# Patient Record
Sex: MALE | Race: WHITE | NOT HISPANIC OR LATINO | Employment: FULL TIME | URBAN - METROPOLITAN AREA
[De-identification: names, ages, dates, MRNs, and addresses within clinical notes are randomized per-mention and may not be internally consistent; named-entity substitution may affect disease eponyms.]

---

## 2017-03-10 DIAGNOSIS — I77.70 ARTERIAL DISSECTION (H): ICD-10-CM

## 2017-03-10 DIAGNOSIS — I10 BENIGN ESSENTIAL HYPERTENSION: ICD-10-CM

## 2017-03-10 DIAGNOSIS — E78.5 HYPERLIPIDEMIA LDL GOAL <70: ICD-10-CM

## 2017-03-10 RX ORDER — ATORVASTATIN CALCIUM 40 MG/1
40 TABLET, FILM COATED ORAL AT BEDTIME
Qty: 90 TABLET | Refills: 1 | Status: SHIPPED | OUTPATIENT
Start: 2017-03-10 | End: 2017-06-21

## 2017-03-10 RX ORDER — LABETALOL 200 MG/1
200 TABLET, FILM COATED ORAL EVERY 12 HOURS
Qty: 180 TABLET | Refills: 1 | Status: SHIPPED | OUTPATIENT
Start: 2017-03-10 | End: 2017-06-21

## 2017-03-10 RX ORDER — HYDROCHLOROTHIAZIDE 25 MG/1
25 TABLET ORAL DAILY
Qty: 90 TABLET | Refills: 1 | Status: SHIPPED | OUTPATIENT
Start: 2017-03-10 | End: 2017-06-21

## 2017-03-10 RX ORDER — AMLODIPINE BESYLATE 5 MG/1
5 TABLET ORAL DAILY
Qty: 90 TABLET | Refills: 1 | Status: SHIPPED | OUTPATIENT
Start: 2017-03-10 | End: 2017-06-21

## 2017-03-10 RX ORDER — RAMIPRIL 10 MG/1
10 CAPSULE ORAL AT BEDTIME
Qty: 90 CAPSULE | Refills: 1 | Status: SHIPPED | OUTPATIENT
Start: 2017-03-10 | End: 2017-06-21

## 2017-03-10 RX ORDER — HYDROCODONE BITARTRATE AND ACETAMINOPHEN 5; 325 MG/1; MG/1
1-2 TABLET ORAL EVERY 4 HOURS PRN
Qty: 30 TABLET | Refills: 0 | Status: SHIPPED | OUTPATIENT
Start: 2017-03-10 | End: 2017-06-08

## 2017-03-10 NOTE — TELEPHONE ENCOUNTER
Ramipral, HCTZ      Last Written Prescription Date: both on 5/26/16  Last Fill Quantity: both 90, # refills: both 3  Last Office Visit with [facility], [facility] or [facility] prescribing provider: 10/26/16       Potassium   Date Value Ref Range Status   10/21/2016 4.1 3.4 - 5.3 mmol/L Final     Creatinine   Date Value Ref Range Status   10/21/2016 0.90 0.66 - 1.25 mg/dL Final     BP Readings from Last 3 Encounters:   10/26/16 126/85   05/26/16 123/82   01/08/16 118/86     Labetalol,amlodipine      Last Written Prescription Date: both 5/26/16  Last Fill Quantity: both 90, # refills: both 3    Last Office Visit with [facility], [facility] or [facility] prescribing provider:  TERESSA   Future Office Visit:        BP Readings from Last 3 Encounters:   10/26/16 126/85   05/26/16 123/82   01/08/16 118/86     Atorvastatin     Last Written Prescription Date: SAB  Last Fill Quantity: 90, # refills: 3  Last Office Visit with [facility], [facility] or [facility] prescribing provider: TERESSA       Lab Results   Component Value Date    CHOL 145 10/21/2016     Lab Results   Component Value Date    HDL 39 10/21/2016     Lab Results   Component Value Date    LDL 83 10/21/2016     Lab Results   Component Value Date    TRIG 115 10/21/2016     Lab Results   Component Value Date    CHOLHDLRATIO 3.6 10/20/2015     Refill done per RN refill protocol  Tran Banuelos, RN, BSN    l

## 2017-06-08 DIAGNOSIS — I77.70 ARTERIAL DISSECTION (H): ICD-10-CM

## 2017-06-08 RX ORDER — HYDROCODONE BITARTRATE AND ACETAMINOPHEN 5; 325 MG/1; MG/1
1-2 TABLET ORAL EVERY 4 HOURS PRN
Qty: 30 TABLET | Refills: 0 | Status: SHIPPED | OUTPATIENT
Start: 2017-06-08 | End: 2017-09-07

## 2017-06-08 NOTE — TELEPHONE ENCOUNTER
Patient would like refill of [place]  Med and pharm loaded  Patient will pick  Up at  today  Tran Banuelos RN, BSN

## 2017-06-13 DIAGNOSIS — I77.79 DISSECTION OF MESENTERIC ARTERY (H): Primary | ICD-10-CM

## 2017-06-14 ENCOUNTER — HOSPITAL ENCOUNTER (OUTPATIENT)
Dept: LAB | Facility: CLINIC | Age: 57
End: 2017-06-14
Attending: INTERNAL MEDICINE
Payer: COMMERCIAL

## 2017-06-14 ENCOUNTER — HOSPITAL ENCOUNTER (OUTPATIENT)
Dept: CT IMAGING | Facility: CLINIC | Age: 57
Discharge: HOME | End: 2017-06-14
Attending: INTERNAL MEDICINE | Admitting: INTERNAL MEDICINE
Payer: COMMERCIAL

## 2017-06-14 DIAGNOSIS — I77.79 DISSECTION OF MESENTERIC ARTERY (H): ICD-10-CM

## 2017-06-14 DIAGNOSIS — Z00.00 LABORATORY EXAMINATION ORDERED AS PART OF A ROUTINE GENERAL MEDICAL EXAMINATION: ICD-10-CM

## 2017-06-14 LAB
ALBUMIN SERPL-MCNC: 3.9 G/DL (ref 3.4–5)
ALP SERPL-CCNC: 59 U/L (ref 40–150)
ALT SERPL W P-5'-P-CCNC: 46 U/L (ref 0–70)
ANION GAP SERPL CALCULATED.3IONS-SCNC: 4 MMOL/L (ref 3–14)
AST SERPL W P-5'-P-CCNC: 20 U/L (ref 0–45)
BILIRUB SERPL-MCNC: 0.5 MG/DL (ref 0.2–1.3)
BUN SERPL-MCNC: 17 MG/DL (ref 7–30)
CALCIUM SERPL-MCNC: 8.8 MG/DL (ref 8.5–10.1)
CHLORIDE SERPL-SCNC: 106 MMOL/L (ref 94–109)
CO2 SERPL-SCNC: 30 MMOL/L (ref 20–32)
CREAT SERPL-MCNC: 0.84 MG/DL (ref 0.66–1.25)
GFR SERPL CREATININE-BSD FRML MDRD: ABNORMAL ML/MIN/1.7M2
GLUCOSE SERPL-MCNC: 113 MG/DL (ref 70–99)
POTASSIUM SERPL-SCNC: 4 MMOL/L (ref 3.4–5.3)
PROT SERPL-MCNC: 6.6 G/DL (ref 6.8–8.8)
SODIUM SERPL-SCNC: 140 MMOL/L (ref 133–144)

## 2017-06-14 PROCEDURE — 25000128 H RX IP 250 OP 636: Performed by: INTERNAL MEDICINE

## 2017-06-14 PROCEDURE — 74174 CTA ABD&PLVS W/CONTRAST: CPT

## 2017-06-14 PROCEDURE — 80053 COMPREHEN METABOLIC PANEL: CPT | Performed by: INTERNAL MEDICINE

## 2017-06-14 PROCEDURE — 36415 COLL VENOUS BLD VENIPUNCTURE: CPT | Performed by: INTERNAL MEDICINE

## 2017-06-14 PROCEDURE — 25000125 ZZHC RX 250: Performed by: INTERNAL MEDICINE

## 2017-06-14 RX ORDER — IOPAMIDOL 755 MG/ML
80 INJECTION, SOLUTION INTRAVASCULAR ONCE
Status: COMPLETED | OUTPATIENT
Start: 2017-06-14 | End: 2017-06-14

## 2017-06-14 RX ADMIN — SODIUM CHLORIDE 80 ML: 9 INJECTION, SOLUTION INTRAVENOUS at 10:17

## 2017-06-14 RX ADMIN — IOPAMIDOL 80 ML: 755 INJECTION, SOLUTION INTRAVENOUS at 10:16

## 2017-06-21 ENCOUNTER — OFFICE VISIT (OUTPATIENT)
Dept: OTHER | Facility: CLINIC | Age: 57
End: 2017-06-21
Attending: INTERNAL MEDICINE
Payer: COMMERCIAL

## 2017-06-21 VITALS
DIASTOLIC BLOOD PRESSURE: 73 MMHG | BODY MASS INDEX: 28.51 KG/M2 | SYSTOLIC BLOOD PRESSURE: 106 MMHG | WEIGHT: 182 LBS | OXYGEN SATURATION: 97 % | HEART RATE: 72 BPM

## 2017-06-21 DIAGNOSIS — E78.5 HYPERLIPIDEMIA LDL GOAL <70: ICD-10-CM

## 2017-06-21 DIAGNOSIS — I10 BENIGN ESSENTIAL HYPERTENSION: ICD-10-CM

## 2017-06-21 DIAGNOSIS — I77.70 ARTERIAL DISSECTION (H): Primary | ICD-10-CM

## 2017-06-21 PROCEDURE — 99215 OFFICE O/P EST HI 40 MIN: CPT | Mod: ZP | Performed by: INTERNAL MEDICINE

## 2017-06-21 PROCEDURE — 99211 OFF/OP EST MAY X REQ PHY/QHP: CPT

## 2017-06-21 RX ORDER — AMLODIPINE BESYLATE 5 MG/1
5 TABLET ORAL DAILY
Qty: 90 TABLET | Refills: 3 | Status: SHIPPED | OUTPATIENT
Start: 2017-06-21 | End: 2018-12-11

## 2017-06-21 RX ORDER — ATORVASTATIN CALCIUM 40 MG/1
40 TABLET, FILM COATED ORAL AT BEDTIME
Qty: 90 TABLET | Refills: 3 | Status: SHIPPED | OUTPATIENT
Start: 2017-06-21 | End: 2019-11-07

## 2017-06-21 RX ORDER — HYDROCHLOROTHIAZIDE 25 MG/1
25 TABLET ORAL DAILY
Qty: 90 TABLET | Refills: 3 | Status: SHIPPED | OUTPATIENT
Start: 2017-06-21 | End: 2018-12-11

## 2017-06-21 RX ORDER — RAMIPRIL 10 MG/1
10 CAPSULE ORAL AT BEDTIME
Qty: 90 CAPSULE | Refills: 3 | Status: SHIPPED | OUTPATIENT
Start: 2017-06-21 | End: 2019-11-07

## 2017-06-21 RX ORDER — LABETALOL 200 MG/1
200 TABLET, FILM COATED ORAL EVERY 12 HOURS
Qty: 180 TABLET | Refills: 3 | Status: SHIPPED | OUTPATIENT
Start: 2017-06-21 | End: 2019-11-07

## 2017-06-21 NOTE — PATIENT INSTRUCTIONS
Your CTA looks really good and completely healed previous dissection ( good!)    Continue current medications    See me in 6 months    Plan for repeating CTA in 18 months.    Follow same instructions.

## 2017-06-21 NOTE — PROGRESS NOTES
HPI: Stanley Garcia is a 55 year old year old patient who receives primary care from Erica Collado here today for the follow-up visit and he was initially  admitted to the [facility] on September 10, 2015-September 16, 2015 for extensive isolated acute SMA dissection without malperfusion.  It was thought spontaneous and his workup was unremarkable and negative.    Later he  Gave a history of injury  one week before dissection( blunt injury to the abdomen) and he ran into edge of  heavy duty board during the Labor Day weekend ( one week before admission)     After the discharge from the hospital he is doing well, blood pressure is well controlled with multiple medications.  He is tolerating statin.  He stopped warfarin last year  and currently on aspirin 162 mg daily  He denies any chest pain, shortness of breath or palpitations.  No abdominal pain, no altered bowel movements.  No hematemesis or melena.    Recent CTA 6/2017 completely healed dissection of SMA.  Reviewed CTA and copy given.    PAST MEDICAL HISTORY  Past Medical History:   Diagnosis Date     Anxiety      HTN (hypertension)      Superior mesenteric artery thrombosis (H)     acute extensive isolated SMA disection without malperfusion 9/2015 admitted to [facility]       CURRENT MEDICATIONS  Current Outpatient Prescriptions   Medication Sig Dispense Refill     ramipril (ALTACE) 10 MG capsule Take 1 capsule (10 mg) by mouth At Bedtime 90 capsule 3     labetalol (NORMODYNE) 200 MG tablet Take 1 tablet (200 mg) by mouth every 12 hours 180 tablet 3     amLODIPine (NORVASC) 5 MG tablet Take 1 tablet (5 mg) by mouth daily 90 tablet 3     atorvastatin (LIPITOR) 40 MG tablet Take 1 tablet (40 mg) by mouth At Bedtime 90 tablet 3     hydrochlorothiazide (HYDRODIURIL) 25 MG tablet Take 1 tablet (25 mg) by mouth daily 90 tablet 3     HYDROcodone-acetaminophen (NORCO) 5-325 MG per tablet Take 1-2 tablets by mouth every 4 hours as needed for moderate to severe pain 30 tablet 0      aspirin EC 81 MG tablet Take 162 mg by mouth       order for DME Equipment being ordered: Blood pressure cuff 1 Device 0     multivitamin, therapeutic (THERA-VIT) TABS Take 1 tablet by mouth daily       [DISCONTINUED] ramipril (ALTACE) 10 MG capsule Take 1 capsule (10 mg) by mouth At Bedtime 90 capsule 1     [DISCONTINUED] labetalol (NORMODYNE) 200 MG tablet Take 1 tablet (200 mg) by mouth every 12 hours 180 tablet 1     [DISCONTINUED] amLODIPine (NORVASC) 5 MG tablet Take 1 tablet (5 mg) by mouth daily 90 tablet 1     [DISCONTINUED] atorvastatin (LIPITOR) 40 MG tablet Take 1 tablet (40 mg) by mouth At Bedtime 90 tablet 1     [DISCONTINUED] hydrochlorothiazide (HYDRODIURIL) 25 MG tablet Take 1 tablet (25 mg) by mouth daily 90 tablet 1       PAST SURGICAL HISTORY:  Past Surgical History:   Procedure Laterality Date     ORTHOPEDIC SURGERY      left ankle       ALLERGIES   No Known Allergies    FAMILY HISTORY  Family History   Problem Relation Age of Onset     Other Cancer Father      esophageal     CEREBROVASCULAR DISEASE Mother        VASCULAR FAMILY HISTORY  1st order relative with atherosclerotic PAD: No  1st order relative with AAA: No    SOCIAL HISTORY  Social History     Social History     Marital status:      Spouse name: Kerry     Number of children: 3     Years of education: N/A     Occupational History     [employer]     Social History Main Topics     Smoking status: Never Smoker     Smokeless tobacco: Former User      Comment: quit at age 29 ( chewed tobacco for 10 years??)     Alcohol use 0.0 oz/week     0 Standard drinks or equivalent per week      Comment: occ beer      Drug use: No     Sexual activity: Yes     Partners: Female     Other Topics Concern     Not on file     Social History Narrative       ROS:   Constitutional: No fever, chills, or sweats. No weight gain/loss   ENT: No visual disturbance, ear ache, epistaxis, sore throat  Allergies/Immunologic: Negative  Respiratory:  No cough, hemoptysia  Cardiovascular: As per HPI  GI: No nausea, vomiting, hematemesis, melena, or hematochezia  : No urinary frequency, dysuria, or hematuria  Integument: Negative  Psychiatric: Negative  Neuro: Negative  Endocrinology: Negative   Musculoskeletal: Negative  Vascular: No walking impairment, claudication, ischemic rest pain or nonhealing wounds    EXAM:  /73 (BP Location: Right arm, Patient Position: Chair, Cuff Size: Adult Large)  Pulse 72  Wt 182 lb (82.6 kg)  SpO2 97%  BMI 28.51 kg/m2  In general, the patient is a pleasant male in no apparent distress.    HEENT: NC/AT.  PERRLA.  EOMI.  Sclerae white, not injected.  Nares clear.  Pharynx without erythema or exudate.  Dentition intact.    Neck: No adenopathy.  No thyromegaly. Carotids +2/2 bilaterally without bruits.  No jugular venous distension.   Heart: RRR. Normal S1, S2 splits physiologically. No murmur, rub, click, or gallop. The PMI is in the 5th ICS in the midclavicular line. There is no heave.    Lungs: CTA.  No ronchi, wheezes, rales.  No dullness to percussion.   Abdomen: Soft, nontender, nondistended. No organomegaly. No AAA.  No bruits.   Extremities: No clubbing, cyanosis, or edema.  No wounds. No varicose veins signs of chronic venous insufficiency.   Vascular: No bruits are noted.   Brachial Radial Ulnar Femoral Popliteal DP PT   Left 2/2 2/2 2/2 2/2 2/2 2/2 2/2   Right 2/2 2/2 2/2 2/2 2/2 2/2 2/2     Labs:  LIPID RESULTS:  Lab Results   Component Value Date    CHOL 145 10/21/2016    HDL 39 (L) 10/21/2016    LDL 83 10/21/2016    TRIG 115 10/21/2016    CHOLHDLRATIO 3.6 10/20/2015       LIVER ENZYME RESULTS:  Lab Results   Component Value Date    AST 20 06/14/2017    ALT 46 06/14/2017       CBC RESULTS:  Lab Results   Component Value Date    WBC 8.8 01/08/2016    RBC 4.99 01/08/2016    HGB 15.9 01/08/2016    HCT 43.7 01/08/2016    MCV 88 01/08/2016    MCH 31.9 01/08/2016    MCHC 36.4 01/08/2016    RDW 12.5 01/08/2016    PLT  233 01/08/2016       BMP RESULTS:  Lab Results   Component Value Date     06/14/2017    POTASSIUM 4.0 06/14/2017    CHLORIDE 106 06/14/2017    CO2 30 06/14/2017    ANIONGAP 4 06/14/2017     (H) 06/14/2017    BUN 17 06/14/2017    CR 0.84 06/14/2017    GFRESTIMATED >90  Non  GFR Calc   06/14/2017    GFRESTBLACK >90   GFR Calc   06/14/2017    ALONDRA 8.8 06/14/2017        A1C RESULTS:  Lab Results   Component Value Date    A1C 5.3 09/11/2015       Procedures:  CTA ANGIOGRAM ABDOMEN 10/13/2015 12:13 PM   HISTORY: Follow up superior mesenteric artery dissection.  COMPARISON: 9/14/2015.  TECHNIQUE: IV contrast CT angiography is performed through the abdomen  and pelvis utilizing 80 mL of Isovue-370. 3D reconstructions were  performed at an independent workstation.  FINDINGS: Again noted is a dissection of the proximal and mid  superior mesenteric artery beginning just beyond its origin and  extending to involve the origins of the first several jejunal  branches. Since the previous study there has been enlargement of the  false lumen with increased pooling of contrast via fenestrations into  the false lumen approximately 2 cm from the vessel origin. The  contrast collection pooling in this region now measures approximately  25 x 10 mm compared to 13 x 7 mm previously. As before, this is  associated with diffuse narrowing of the true lumen in this region,  although the vessel remains patent with filling of the distal jejunal  branches. As before, the remaining false lumen is unopacified and  presumably is thrombosed. The celiac trunk, the inferior mesenteric  artery, and the renal arteries remain patent and unchanged. The  abdominal aorta and the iliac vessels are normal and unchanged. There  is no evidence of mural edema to suggest bowel ischemia, although the  accuracy of this is diminished due to the lack of oral contrast.  Remainder of the abdomen and pelvis is  unchanged.  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 There  is persistent but stable narrowing of the true lumen of the proximal  and mid superior mesenteric artery which does not appear to be  progressively compromised despite the enlarging false lumen. The  remaining visceral vessels as well as the aortoiliac vessels remain  normal and unchanged. No evidence of mural edema to suggest bowel  ischemia on this nonoral contrast scan.  RENZO BURCIAGA MD        CTA ANGIOGRAM ABDOMEN AND PELVIS  5/19/2016 1:58 PM       HISTORY:  Follow up superior mesenteric artery dissection.     COMPARISON:  1/8/2016.     TECHNIQUE: IV contrast CT angiography is performed through the abdomen  and pelvis utilizing 80 mL of Isovue-370. 3D reconstructions were  performed at an independent workstation.     FINDINGS: Again noted is a localized dissection of the superior  mesenteric artery beginning approximately 2 cm from its origin. Since  the previous study there has been almost complete thrombosis of the  false lumen with improvement in the degree of extrinsic compression of  the true lumen which is now almost completely re-expanded to normal  caliber. There is now only minimal opacification of the false lumen  with contrast. There is no evidence of expansion of the dissection.     The celiac trunk, the inferior mesenteric artery, and the renal  arteries remain widely patent. The abdominal aorta and the common  iliac arteries normal.     There are stable multiple hepatic and right lower pole renal cysts.  There is a small stable nodule involving the medial aspect of the  right adrenal gland. There also appears to be mild stable thickening  or small nodule in the medial aspect of the left adrenal gland. These  are unchanged. Remainder of the abdomen and pelvis is negative except  for hypertrophic degenerative changes in the lumbar  spine.                                                                       IMPRESSION:    1. Interval partial thrombosis of the false lumen of the previous  superior mesenteric artery localized dissection with resultant  improvement in the luminal diameter of the true lumen with reduction  in extrinsic compression of the true lumen.  2. The remaining visceral vessels as well as the abdominal aorta and  visualized iliac arteries are normal.  3. Stable hepatic and right renal cyst.  4. Stable thickening or small nodule in the right adrenal gland and  stable mild thickening in the left adrenal gland.       RENZO BURCIAGA MD        CTA ANGIOGRAM ABDOMEN AND PELVIS  6/14/2017  1:16 PM     HISTORY: 57-year-old patient with history of superior mesenteric  arterial dissection.     COMPARISON: May 19, 2016.     TECHNIQUE: Multiplanar and multi formatted CTA images were obtained  from the lung bases through the abdomen and pelvis after the  uneventful administration of Isovue-370 intravenous contrast given for  a total of 80 mL. Radiation dose for this scan was reduced using  automated exposure control, adjustment of the mA and/or kV according  to patient size, or iterative reconstruction technique. 3-D  reformatted images were created at a separate workstation.     FINDINGS: The visible lung bases are clear. Heart size is normal. No  pleural effusion or pericardial effusion. Multiple hepatic cysts are  identified. The largest is in the left hepatic lobe measuring 4  Hounsfield units and 3.7 x 4.2 cm, relatively unchanged from a  previous exam. The gallbladder, spleen, adrenal glands, and pancreas  are unremarkable. Both kidneys are normally perfused and without  hydronephrosis. Right renal hypodensities again identified and  unchanged. No intraperitoneal fluid or air. Bladder is mostly  decompressed and unremarkable. Sigmoid colon diverticulosis, without  evidence of diverticulitis. Appendix is normal in size and  appearance  in the right lower quadrant. No acute osseous abnormality.     The abdominal aorta is normal in size and appearance. The celiac axis,  SMA, bilateral renal, the inferior mesenteric arteries are patent.  Both common iliac, internal iliac, external iliac, common femoral  arteries are patent. Small focal dissection is again visible in the  superior mesenteric artery. Widest diameter is up to 9 mm AP,  unchanged from previous exam. The appearance of a possible dissection  is best identified on axial CT imaging, though difficult to visualize  on sagittal imaging. In comparison to previous CT exam on January 8, 2016, suspect this is an essentially healed dissection without  residual stenosis.         IMPRESSION:  1. Patent visceral arteries. Specifically, previously identified  dissection in the superior mesenteric artery appears to have  essentially completely healed. Minimal residual irregularity, though  not flow-limiting and unchanged from most recent CT exam on May 19,  2016. No new or additional dissections.  2. Unchanged hepatic and right renal cysts.     EMIGDIO WISEMAN MD    Assessment and Plan:     1. Extensive isolated acute SMA dissection without malperfusion 9/10/15 :( this appears related to blunt injury to the abdomen, ran into edge of the board  one week before the admission ??) improved on recent CTA of abdomen 5/19/16.    The patient presented to the [facility]  after an acute onset of significant abdominal pain. Imaging revealed an extensive acute superior mesenteric artery dissection. The true lumen of the dissection was patent but diffusely narrowed throughout its extent. There was no evidence of bowel ischemia. Due to the extensive size of the dissection (approximately 8 cm in length), the patient was admitted and started on IV heparin. He was evaluated by Vascular Surgery and no surgical intervention was indicated. At that time it was thought  likely  Due to secondary to severe hypertension as  his blood pressure was moderately elevated upon arrival.     Currently on multiple BP meds with excellent control  Repeat CTA last week completely healed dissection  He denies any CP, Abdominal pain, no weight loss or postprandial issues.    He has no family history of connective tissue disorders and his clinical exam did not fit with Marfan's, EDS or LDS. In addition, his rheumatoid factor, TRINA, and vasculitis panel were all negative.  He is currently  on multiple antihypertensives, including amlodipine, labetalol, hydrochlorothiazide, and ramipril. He was  initiated Lipitor 40 mg daily for pleiotropic benefit.No abdominal pain, hematemesis or melena.  His blood pressure is well controlled.     He underwent repeat CTA in 6/2017 As delineated above, completely healed dissection.    Reviewed recent lab results in care everywhere done at [facility] in 3/2016  Improved LDL 62, normal renal Fx  Normal HGB etc.      Plan:  Continue current medications  Avoid lifting heavy weights  Continue  aspirin 162 mg daily with food.  Maintain good BP  Plan for CTA abdomen in 18 months ( 12/2018)      2. Hypertension    His blood pressure is well controlled systolic less than 120 consistently.  Tolerating medications without any problem.  Continue the same  Reviewed out side BP readings.      3. Generalized Anxiety    Improved and he is not requiring any Xanax or Ativan.    Time spent today 50 minutes, face-to-face and more than 50% time spent counseling and coordination of the care    This note was dictated by utilizing dragon software  Cc: Primary care physician and   Dr. Marcella Momin M.D.  RTC 4 months.

## 2017-06-21 NOTE — NURSING NOTE
"Chief Complaint   Patient presents with     RECHECK     follow up CT and labs       Initial /73 (BP Location: Right arm, Patient Position: Chair, Cuff Size: Adult Large)  Pulse 72  Wt 182 lb (82.6 kg)  SpO2 97%  BMI 28.51 kg/m2 Estimated body mass index is 28.51 kg/(m^2) as calculated from the following:    Height as of 1/8/16: 5' 7\" (1.702 m).    Weight as of this encounter: 182 lb (82.6 kg).  Medication Reconciliation: complete     Face to face nursing time: 8 minutes    Beth Ho MA     "

## 2017-06-21 NOTE — MR AVS SNAPSHOT
"              After Visit Summary   6/21/2017    Stanley Garcia    MRN: [id number]           Patient Information     Date Of Birth          1960        Visit Information        Provider Department      6/21/2017 9:00 AM Nita Romero MD [facility]  [facility]      Today's Diagnoses      spontaneous SMA disection with no malperfusion 9/10/2015    -  1    Hyperlipidemia LDL goal <70        Benign essential hypertension          Care Instructions    Your CTA looks really good and completely healed previous dissection ( good!)    Continue current medications    See me in 6 months    Plan for repeating CTA in 18 months.    Follow same instructions.          Follow-ups after your visit        Who to contact     If you have questions or need follow up information about today's clinic visit or your schedule please contact [facility] directly at [phone].  Normal or non-critical lab and imaging results will be communicated to you by MyChart, letter or phone within 4 business days after the clinic has received the results. If you do not hear from us within 7 days, please contact the clinic through MyChart or phone. If you have a critical or abnormal lab result, we will notify you by phone as soon as possible.  Submit refill requests through [employer] [facility] or call your pharmacy and they will forward the refill request to us. Please allow 3 business days for your refill to be completed.          Additional Information About Your Visit        MyChart Information     [employer] lets you send messages to your doctor, view your test results, renew your prescriptions, schedule appointments and more. To sign up, go to [email or web address] . Click on \"Log in\" on the left side of the screen, which will take you to the Welcome page. Then click on \"Sign up Now\" on the right side of the page.     You will be asked to enter the access code listed " below, as well as some personal information. Please follow the directions to create your username and password.     Your access code is: [email or web address]  Expires: 2017  9:54 AM     Your access code will  in 90 days. If you need help or a new code, please call your [place] clinic or [phone].        Care EveryWhere ID     This is your Care EveryWhere ID. This could be used by other organizations to access your [place] medical records  [id number]        Your Vitals Were     Pulse Pulse Oximetry BMI (Body Mass Index)             72 97% 28.51 kg/m2          Blood Pressure from Last 3 Encounters:   17 106/73   10/26/16 126/85   16 123/82    Weight from Last 3 Encounters:   17 182 lb (82.6 kg)   10/26/16 179 lb 6.4 oz (81.4 kg)   16 178 lb 3.2 oz (80.8 kg)              We Performed the Following     Follow-Up with Vascular Medicine          Where to get your medicines      These medications were sent to [facility] [address] [place] [address] [address] [facility]  [address] [facility] [address]     Phone:  [phone]     amLODIPine 5 MG tablet    atorvastatin 40 MG tablet    hydrochlorothiazide 25 MG tablet    labetalol 200 MG tablet    ramipril 10 MG capsule          Primary Care Provider Office Phone # Fax #    Erica Collado [phone]       [facility] [id number] [place] [address]        Equal Access to Services     MURTAZA MONTES AH: Hadii aad ku hadasho Soomaali, waaxda luqadaha, qaybta kaalmada adeegyada, rosalba quinteros. So [facility] [phone].    ATENCIÓN: Si habla español, tiene a rodriguez disposición servicios gratuitos de asistencia lingüística. Llame al [phone].    We comply with applicable federal civil rights laws and [place] laws. We do not discriminate on the basis of race, color, national origin, age, disability sex, sexual orientation or gender identity.            Thank you!     Thank you  for choosing [facility]  for your care. Our goal is always to provide you with excellent care. Hearing back from our patients is one way we can continue to improve our services. Please take a few minutes to complete the written survey that you may receive in the mail after your visit with us. Thank you!             Your Updated Medication List - Protect others around you: Learn how to safely use, store and throw away your medicines at www.disposemymeds.org.          This list is accurate as of: 6/21/17  9:54 AM.  Always use your most recent med list.                   Brand Name Dispense Instructions for use Diagnosis    amLODIPine 5 MG tablet    NORVASC    90 tablet    Take 1 tablet (5 mg) by mouth daily    Benign essential hypertension       aspirin EC 81 MG EC tablet      Take 162 mg by mouth        atorvastatin 40 MG tablet    LIPITOR    90 tablet    Take 1 tablet (40 mg) by mouth At Bedtime    Hyperlipidemia LDL goal <70, Arterial dissection (H)       hydrochlorothiazide 25 MG tablet    HYDRODIURIL    90 tablet    Take 1 tablet (25 mg) by mouth daily    Benign essential hypertension       HYDROcodone-acetaminophen 5-325 MG per tablet    NORCO    30 tablet    Take 1-2 tablets by mouth every 4 hours as needed for moderate to severe pain    Arterial dissection (H)       labetalol 200 MG tablet    NORMODYNE    180 tablet    Take 1 tablet (200 mg) by mouth every 12 hours    Benign essential hypertension       multivitamin, therapeutic Tabs tablet      Take 1 tablet by mouth daily        order for DME     1 Device    Equipment being ordered: Blood pressure cuff    HTN (hypertension), Arterial dissection (H)       ramipril 10 MG capsule    ALTACE    90 capsule    Take 1 capsule (10 mg) by mouth At Bedtime    Benign essential hypertension

## 2017-09-07 DIAGNOSIS — I77.70 ARTERIAL DISSECTION (H): ICD-10-CM

## 2017-09-07 RX ORDER — HYDROCODONE BITARTRATE AND ACETAMINOPHEN 5; 325 MG/1; MG/1
1-2 TABLET ORAL EVERY 4 HOURS PRN
Qty: 30 TABLET | Refills: 0 | Status: SHIPPED | OUTPATIENT
Start: 2017-09-07 | End: 2017-11-22

## 2017-11-22 ENCOUNTER — OFFICE VISIT (OUTPATIENT)
Dept: OTHER | Facility: CLINIC | Age: 57
End: 2017-11-22
Attending: INTERNAL MEDICINE
Payer: COMMERCIAL

## 2017-11-22 VITALS
HEART RATE: 64 BPM | DIASTOLIC BLOOD PRESSURE: 83 MMHG | OXYGEN SATURATION: 98 % | SYSTOLIC BLOOD PRESSURE: 123 MMHG | WEIGHT: 180 LBS | BODY MASS INDEX: 28.19 KG/M2

## 2017-11-22 DIAGNOSIS — E78.5 HYPERLIPIDEMIA LDL GOAL <70: ICD-10-CM

## 2017-11-22 DIAGNOSIS — I10 BENIGN ESSENTIAL HYPERTENSION: ICD-10-CM

## 2017-11-22 DIAGNOSIS — F41.1 GENERALIZED ANXIETY DISORDER: ICD-10-CM

## 2017-11-22 DIAGNOSIS — I77.70 ARTERIAL DISSECTION (H): Primary | ICD-10-CM

## 2017-11-22 PROCEDURE — 99211 OFF/OP EST MAY X REQ PHY/QHP: CPT

## 2017-11-22 PROCEDURE — 99214 OFFICE O/P EST MOD 30 MIN: CPT | Mod: ZP | Performed by: INTERNAL MEDICINE

## 2017-11-22 RX ORDER — HYDROCODONE BITARTRATE AND ACETAMINOPHEN 5; 325 MG/1; MG/1
1-2 TABLET ORAL EVERY 4 HOURS PRN
Qty: 30 TABLET | Refills: 0 | Status: SHIPPED | OUTPATIENT
Start: 2017-11-22 | End: 2018-12-11

## 2017-11-22 NOTE — MR AVS SNAPSHOT
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 Continue same medications    2. Get fasting lipids at primary     3. Will plan for repeat CTA of abdomen  18 months from last one     4.monitor BP          Follow-ups after your visit        Follow-up notes from your care team     Return in about 6 months (around 5/22/2018).      Who to contact     If you have questions or need follow up information about today's clinic visit or your schedule please contact [facility] directly at [phone].  Normal or non-critical lab and imaging results will be communicated to you by [employer], letter or phone within 4 business days after the clinic has received the results. If you do not hear from us within 7 days, please contact the clinic through [employer] or phone. If you have a critical or abnormal lab result, we will notify you by phone as soon as possible.  Submit refill requests through [employer] or call your pharmacy and they will forward the refill request to us. Please allow 3 business days for your refill to be completed.          Additional Information About Your Visit        [employer] Information     UpTap lets you send messages to your doctor, view your test results, renew your prescriptions, schedule appointments and more. To sign up, go to [email or web address] . Click on \"Log in\" on the left side of the screen, which will take you to the Welcome page. Then click on \"Sign up Now\" " on the right side of the page.     You will be asked to enter the access code listed below, as well as some personal information. Please follow the directions to create your username and password.     Your access code is: [email or web address]  Expires: 2018  1:51 PM     Your access code will  in 90 days. If you need help or a new code, please call your [facility] clinic or [phone].        Care EveryWhere ID     This is your Care EveryWhere ID. This could be used by other organizations to access your [facility] medical records  [id number]        Your Vitals Were     Pulse Pulse Oximetry BMI (Body Mass Index)             64 98% 28.19 kg/m2          Blood Pressure from Last 3 Encounters:   17 123/83   17 106/73   10/26/16 126/85    Weight from Last 3 Encounters:   17 180 lb (81.6 kg)   17 182 lb (82.6 kg)   10/26/16 179 lb 6.4 oz (81.4 kg)              Today, you had the following     No orders found for display         Where to get your medicines      Some of these will need a paper prescription and others can be bought over the counter.  Ask your nurse if you have questions.     Bring a paper prescription for each of these medications     HYDROcodone-acetaminophen 5-325 MG per tablet          Primary Care Provider Office Phone # Fax #    Erica Collado [phone]       [place] [id number]        Equal Access to Services     MARCOS MONTES : Hadii sreekanth ku hadasho Soomaali, waaxda luqadaha, qaybta kaalmada adeegyada, rosalba bar adejack guerrero . So [facility] [phone].    ATENCIÓN: Si habla español, tiene a rodriguez disposición servicios gratuitos de asistencia lingüística. Llame al [phone].    We comply with applicable federal civil rights laws and [place] laws. We do not discriminate on the basis of race, color, national origin, age, disability, sex, sexual orientation, or gender identity.            Thank you!     Thank  you for choosing [facility]  for your care. Our goal is always to provide you with excellent care. Hearing back from our patients is one way we can continue to improve our services. Please take a few minutes to complete the written survey that you may receive in the mail after your visit with us. Thank you!             Your Updated Medication List - Protect others around you: Learn how to safely use, store and throw away your medicines at www.disposemymeds.org.          This list is accurate as of: 11/22/17  1:51 PM.  Always use your most recent med list.                   Brand Name Dispense Instructions for use Diagnosis    amLODIPine 5 MG tablet    NORVASC    90 tablet    Take 1 tablet (5 mg) by mouth daily    Benign essential hypertension       aspirin EC 81 MG EC tablet      Take 162 mg by mouth        atorvastatin 40 MG tablet    LIPITOR    90 tablet    Take 1 tablet (40 mg) by mouth At Bedtime    Hyperlipidemia LDL goal <70, Arterial dissection (H)       hydrochlorothiazide 25 MG tablet    HYDRODIURIL    90 tablet    Take 1 tablet (25 mg) by mouth daily    Benign essential hypertension       HYDROcodone-acetaminophen 5-325 MG per tablet    NORCO    30 tablet    Take 1-2 tablets by mouth every 4 hours as needed for moderate to severe pain    Arterial dissection (H)       labetalol 200 MG tablet    NORMODYNE    180 tablet    Take 1 tablet (200 mg) by mouth every 12 hours    Benign essential hypertension       multivitamin, therapeutic Tabs tablet      Take 1 tablet by mouth daily        order for DME     1 Device    Equipment being ordered: Blood pressure cuff    HTN (hypertension), Arterial dissection (H)       ramipril 10 MG capsule    ALTACE    90 capsule    Take 1 capsule (10 mg) by mouth At Bedtime    Benign essential hypertension

## 2017-11-22 NOTE — NURSING NOTE
"Chief Complaint   Patient presents with     RECHECK     6 months follow up       Initial /83 (BP Location: Right arm, Patient Position: Chair, Cuff Size: Adult Large)  Pulse 64  Wt 180 lb (81.6 kg)  SpO2 98%  BMI 28.19 kg/m2 Estimated body mass index is 28.19 kg/(m^2) as calculated from the following:    Height as of 1/8/16: 5' 7\" (1.702 m).    Weight as of this encounter: 180 lb (81.6 kg).  Medication Reconciliation: complete     Face to face nursing time: 8 minutes    Beth Ho MA     "

## 2017-11-22 NOTE — PATIENT INSTRUCTIONS
1. Continue same medications    2. Get fasting lipids at primary     3. Will plan for repeat CTA of abdomen  18 months from last one     4.monitor BP

## 2017-11-22 NOTE — Clinical Note
Patient to follow up with Primary Care provider regarding elevated blood pressure.   Please mail my note to out side primary.

## 2017-11-22 NOTE — PROGRESS NOTES
CC: follow up visit, doing well. rarely using narco for pain. CTA in June 2017 healed dissection.  No abdominal pain.tolerating meds and well controlled BP and lipids.    HPI: Stanley Garcia is a 55 year old year old patient who receives primary care from Erica Collado here today for the follow-up visit and he was initially  admitted to the [facility] on September 10, 2015-September 16, 2015 for extensive isolated acute SMA dissection without malperfusion.  It was thought spontaneous and his workup was unremarkable and negative.    Later he  Gave a history of injury  one week before dissection( blunt injury to the abdomen) and he ran into edge of  heavy duty board during the Labor Day weekend ( one week before admission)     After the discharge from the hospital he is doing well, blood pressure is well controlled with multiple medications.  He is tolerating statin.  He stopped warfarin last year  and currently on aspirin 162 mg daily  He denies any chest pain, shortness of breath or palpitations.  No abdominal pain, no altered bowel movements.  No hematemesis or melena.    Recent CTA 6/2017 completely healed dissection of SMA.  Reviewed CTA and copy given.    PAST MEDICAL HISTORY  Past Medical History:   Diagnosis Date     Anxiety      HTN (hypertension)      Superior mesenteric artery thrombosis (H)     acute extensive isolated SMA disection without malperfusion 9/2015 admitted to [facility]       CURRENT MEDICATIONS  Current Outpatient Prescriptions   Medication Sig Dispense Refill     HYDROcodone-acetaminophen (NORCO) 5-325 MG per tablet Take 1-2 tablets by mouth every 4 hours as needed for moderate to severe pain 30 tablet 0     ramipril (ALTACE) 10 MG capsule Take 1 capsule (10 mg) by mouth At Bedtime 90 capsule 3     labetalol (NORMODYNE) 200 MG tablet Take 1 tablet (200 mg) by mouth every 12 hours 180 tablet 3     amLODIPine (NORVASC) 5 MG tablet Take 1 tablet (5 mg) by mouth daily 90 tablet 3     atorvastatin (LIPITOR) 40  MG tablet Take 1 tablet (40 mg) by mouth At Bedtime 90 tablet 3     hydrochlorothiazide (HYDRODIURIL) 25 MG tablet Take 1 tablet (25 mg) by mouth daily 90 tablet 3     aspirin EC 81 MG tablet Take 162 mg by mouth       order for DME Equipment being ordered: Blood pressure cuff 1 Device 0     multivitamin, therapeutic (THERA-VIT) TABS Take 1 tablet by mouth daily         PAST SURGICAL HISTORY:  Past Surgical History:   Procedure Laterality Date     ORTHOPEDIC SURGERY      left ankle       ALLERGIES   No Known Allergies    FAMILY HISTORY  Family History   Problem Relation Age of Onset     CEREBROVASCULAR DISEASE Mother      Other Cancer Father      esophageal       VASCULAR FAMILY HISTORY  1st order relative with atherosclerotic PAD: No  1st order relative with AAA: No    SOCIAL HISTORY  Social History     Social History     Marital status:      Spouse name: Kerry     Number of children: 3     Years of education: N/A     Occupational History     [employer]     Social History Main Topics     Smoking status: Never Smoker     Smokeless tobacco: Former User      Comment: quit at age 29 ( chewed tobacco for 10 years??)     Alcohol use 0.0 oz/week     0 Standard drinks or equivalent per week      Comment: occ beer      Drug use: No     Sexual activity: Yes     Partners: Female     Other Topics Concern     Not on file     Social History Narrative       ROS:   Constitutional: No fever, chills, or sweats. No weight gain/loss   ENT: No visual disturbance, ear ache, epistaxis, sore throat  Allergies/Immunologic: Negative  Respiratory: No cough, hemoptysia  Cardiovascular: As per HPI  GI: No nausea, vomiting, hematemesis, melena, or hematochezia  : No urinary frequency, dysuria, or hematuria  Integument: Negative  Psychiatric: Negative  Neuro: Negative  Endocrinology: Negative   Musculoskeletal: Negative  Vascular: No walking impairment, claudication, ischemic rest pain or nonhealing wounds    EXAM:  BP  123/83 (BP Location: Right arm, Patient Position: Chair, Cuff Size: Adult Large)  Pulse 64  Wt 180 lb (81.6 kg)  SpO2 98%  BMI 28.19 kg/m2  In general, the patient is a pleasant male in no apparent distress.    HEENT: NC/AT.  PERRLA.  EOMI.  Sclerae white, not injected.  Nares clear.  Pharynx without erythema or exudate.  Dentition intact.    Neck: No adenopathy.  No thyromegaly. Carotids +2/2 bilaterally without bruits.  No jugular venous distension.   Heart: RRR. Normal S1, S2 splits physiologically. No murmur, rub, click, or gallop. The PMI is in the 5th ICS in the midclavicular line. There is no heave.    Lungs: CTA.  No ronchi, wheezes, rales.  No dullness to percussion.   Abdomen: Soft, nontender, nondistended. No organomegaly. No AAA.  No bruits.   Extremities: No clubbing, cyanosis, or edema.  No wounds. No varicose veins signs of chronic venous insufficiency.   Vascular: No bruits are noted.   Brachial Radial Ulnar Femoral Popliteal DP PT   Left 2/2 2/2 2/2 2/2 2/2 2/2 2/2   Right 2/2 2/2 2/2 2/2 2/2 2/2 2/2     Labs:  LIPID RESULTS:  Lab Results   Component Value Date    CHOL 145 10/21/2016    HDL 39 (L) 10/21/2016    LDL 83 10/21/2016    TRIG 115 10/21/2016    CHOLHDLRATIO 3.6 10/20/2015       LIVER ENZYME RESULTS:  Lab Results   Component Value Date    AST 20 06/14/2017    ALT 46 06/14/2017       CBC RESULTS:  Lab Results   Component Value Date    WBC 8.8 01/08/2016    RBC 4.99 01/08/2016    HGB 15.9 01/08/2016    HCT 43.7 01/08/2016    MCV 88 01/08/2016    MCH 31.9 01/08/2016    MCHC 36.4 01/08/2016    RDW 12.5 01/08/2016     01/08/2016       BMP RESULTS:  Lab Results   Component Value Date     06/14/2017    POTASSIUM 4.0 06/14/2017    CHLORIDE 106 06/14/2017    CO2 30 06/14/2017    ANIONGAP 4 06/14/2017     (H) 06/14/2017    BUN 17 06/14/2017    CR 0.84 06/14/2017    GFRESTIMATED >90  Non  GFR Calc   06/14/2017    GFRESTBLACK >90   GFR Calc    06/14/2017    ALONDRA 8.8 06/14/2017        A1C RESULTS:  Lab Results   Component Value Date    A1C 5.3 09/11/2015       Procedures:  CTA ANGIOGRAM ABDOMEN 10/13/2015 12:13 PM   HISTORY: Follow up superior mesenteric artery dissection.  COMPARISON: 9/14/2015.  TECHNIQUE: IV contrast CT angiography is performed through the abdomen  and pelvis utilizing 80 mL of Isovue-370. 3D reconstructions were  performed at an independent workstation.  FINDINGS: Again noted is a dissection of the proximal and mid  superior mesenteric artery beginning just beyond its origin and  extending to involve the origins of the first several jejunal  branches. Since the previous study there has been enlargement of the  false lumen with increased pooling of contrast via fenestrations into  the false lumen approximately 2 cm from the vessel origin. The  contrast collection pooling in this region now measures approximately  25 x 10 mm compared to 13 x 7 mm previously. As before, this is  associated with diffuse narrowing of the true lumen in this region,  although the vessel remains patent with filling of the distal jejunal  branches. As before, the remaining false lumen is unopacified and  presumably is thrombosed. The celiac trunk, the inferior mesenteric  artery, and the renal arteries remain patent and unchanged. The  abdominal aorta and the iliac vessels are normal and unchanged. There  is no evidence of mural edema to suggest bowel ischemia, although the  accuracy of this is diminished due to the lack of oral contrast.  Remainder of the abdomen and pelvis is unchanged.  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 There  is persistent but stable narrowing of the true lumen of the proximal  and mid superior mesenteric artery which does not appear to be  progressively compromised despite the enlarging false lumen. The  remaining visceral  vessels as well as the aortoiliac vessels remain  normal and unchanged. No evidence of mural edema to suggest bowel  ischemia on this nonoral contrast scan.  RENZO BURCIAGA MD        CTA ANGIOGRAM ABDOMEN AND PELVIS  5/19/2016 1:58 PM       HISTORY:  Follow up superior mesenteric artery dissection.     COMPARISON:  1/8/2016.     TECHNIQUE: IV contrast CT angiography is performed through the abdomen  and pelvis utilizing 80 mL of Isovue-370. 3D reconstructions were  performed at an independent workstation.     FINDINGS: Again noted is a localized dissection of the superior  mesenteric artery beginning approximately 2 cm from its origin. Since  the previous study there has been almost complete thrombosis of the  false lumen with improvement in the degree of extrinsic compression of  the true lumen which is now almost completely re-expanded to normal  caliber. There is now only minimal opacification of the false lumen  with contrast. There is no evidence of expansion of the dissection.     The celiac trunk, the inferior mesenteric artery, and the renal  arteries remain widely patent. The abdominal aorta and the common  iliac arteries normal.     There are stable multiple hepatic and right lower pole renal cysts.  There is a small stable nodule involving the medial aspect of the  right adrenal gland. There also appears to be mild stable thickening  or small nodule in the medial aspect of the left adrenal gland. These  are unchanged. Remainder of the abdomen and pelvis is negative except  for hypertrophic degenerative changes in the lumbar spine.                                                                       IMPRESSION:    1. Interval partial thrombosis of the false lumen of the previous  superior mesenteric artery localized dissection with resultant  improvement in the luminal diameter of the true lumen with reduction  in extrinsic compression of the true lumen.  2. The remaining visceral vessels as well as the  abdominal aorta and  visualized iliac arteries are normal.  3. Stable hepatic and right renal cyst.  4. Stable thickening or small nodule in the right adrenal gland and  stable mild thickening in the left adrenal gland.       RENZO BURCIAGA MD        CTA ANGIOGRAM ABDOMEN AND PELVIS  6/14/2017  1:16 PM     HISTORY: 57-year-old patient with history of superior mesenteric  arterial dissection.     COMPARISON: May 19, 2016.     TECHNIQUE: Multiplanar and multi formatted CTA images were obtained  from the lung bases through the abdomen and pelvis after the  uneventful administration of Isovue-370 intravenous contrast given for  a total of 80 mL. Radiation dose for this scan was reduced using  automated exposure control, adjustment of the mA and/or kV according  to patient size, or iterative reconstruction technique. 3-D  reformatted images were created at a separate workstation.     FINDINGS: The visible lung bases are clear. Heart size is normal. No  pleural effusion or pericardial effusion. Multiple hepatic cysts are  identified. The largest is in the left hepatic lobe measuring 4  Hounsfield units and 3.7 x 4.2 cm, relatively unchanged from a  previous exam. The gallbladder, spleen, adrenal glands, and pancreas  are unremarkable. Both kidneys are normally perfused and without  hydronephrosis. Right renal hypodensities again identified and  unchanged. No intraperitoneal fluid or air. Bladder is mostly  decompressed and unremarkable. Sigmoid colon diverticulosis, without  evidence of diverticulitis. Appendix is normal in size and appearance  in the right lower quadrant. No acute osseous abnormality.     The abdominal aorta is normal in size and appearance. The celiac axis,  SMA, bilateral renal, the inferior mesenteric arteries are patent.  Both common iliac, internal iliac, external iliac, common femoral  arteries are patent. Small focal dissection is again visible in the  superior mesenteric artery. Widest diameter is  up to 9 mm AP,  unchanged from previous exam. The appearance of a possible dissection  is best identified on axial CT imaging, though difficult to visualize  on sagittal imaging. In comparison to previous CT exam on January 8, 2016, suspect this is an essentially healed dissection without  residual stenosis.         IMPRESSION:  1. Patent visceral arteries. Specifically, previously identified  dissection in the superior mesenteric artery appears to have  essentially completely healed. Minimal residual irregularity, though  not flow-limiting and unchanged from most recent CT exam on May 19,  2016. No new or additional dissections.  2. Unchanged hepatic and right renal cysts.     EMIGDIO WISEMAN MD    CTA ANGIOGRAM ABDOMEN AND PELVIS  6/14/2017  1:16 PM     HISTORY: 57-year-old patient with history of superior mesenteric  arterial dissection.     COMPARISON: May 19, 2016.     TECHNIQUE: Multiplanar and multi formatted CTA images were obtained  from the lung bases through the abdomen and pelvis after the  uneventful administration of Isovue-370 intravenous contrast given for  a total of 80 mL. Radiation dose for this scan was reduced using  automated exposure control, adjustment of the mA and/or kV according  to patient size, or iterative reconstruction technique. 3-D  reformatted images were created at a separate workstation.     FINDINGS: The visible lung bases are clear. Heart size is normal. No  pleural effusion or pericardial effusion. Multiple hepatic cysts are  identified. The largest is in the left hepatic lobe measuring 4  Hounsfield units and 3.7 x 4.2 cm, relatively unchanged from a  previous exam. The gallbladder, spleen, adrenal glands, and pancreas  are unremarkable. Both kidneys are normally perfused and without  hydronephrosis. Right renal hypodensities again identified and  unchanged. No intraperitoneal fluid or air. Bladder is mostly  decompressed and unremarkable. Sigmoid colon diverticulosis,  without  evidence of diverticulitis. Appendix is normal in size and appearance  in the right lower quadrant. No acute osseous abnormality.     The abdominal aorta is normal in size and appearance. The celiac axis,  SMA, bilateral renal, the inferior mesenteric arteries are patent.  Both common iliac, internal iliac, external iliac, common femoral  arteries are patent. Small focal dissection is again visible in the  superior mesenteric artery. Widest diameter is up to 9 mm AP,  unchanged from previous exam. The appearance of a possible dissection  is best identified on axial CT imaging, though difficult to visualize  on sagittal imaging. In comparison to previous CT exam on January 8, 2016, suspect this is an essentially healed dissection without  residual stenosis.         IMPRESSION:  1. Patent visceral arteries. Specifically, previously identified  dissection in the superior mesenteric artery appears to have  essentially completely healed. Minimal residual irregularity, though  not flow-limiting and unchanged from most recent CT exam on May 19,  2016. No new or additional dissections.  2. Unchanged hepatic and right renal cysts.     EMIGDIO WISEMAN MD    Assessment and Plan:     1. Extensive isolated acute SMA dissection without malperfusion 9/10/15 :( this appears related to blunt injury to the abdomen, ran into edge of the board  one week before the admission ??) improved on recent CTA of abdomen 5/19/16 and healed dissection on CTA 6/2017 as delineated above.    The patient presented to the [facility]  after an acute onset of significant abdominal pain. Imaging revealed an extensive acute superior mesenteric artery dissection. The true lumen of the dissection was patent but diffusely narrowed throughout its extent. There was no evidence of bowel ischemia. Due to the extensive size of the dissection (approximately 8 cm in length), the patient was admitted and started on IV heparin. He was evaluated by Vascular Surgery  and no surgical intervention was indicated. At that time it was thought  likely  Due to secondary to severe hypertension as his blood pressure was moderately elevated upon arrival.     Currently on multiple BP meds with excellent control  Repeat CTA last week completely healed dissection  He denies any CP, Abdominal pain, no weight loss or postprandial issues.    He has no family history of connective tissue disorders and his clinical exam did not fit with Marfan's, EDS or LDS. In addition, his rheumatoid factor, TRINA, and vasculitis panel were all negative.  He is currently  on multiple antihypertensives, including amlodipine, labetalol, hydrochlorothiazide, and ramipril. He was  initiated Lipitor 40 mg daily for pleiotropic benefit.No abdominal pain, hematemesis or melena.  His blood pressure is well controlled.     He underwent repeat CTA in 6/2017 As delineated above, completely healed dissection.    Reviewed recent lab results in care everywhere done at primary in 3/2016  Improved LDL 62, normal renal Fx  Normal HGB etc.      Plan:  Continue current medications  Avoid lifting heavy weights  Continue  aspirin 162 mg daily with food.  Maintain good BP  Plan for CTA abdomen in 18 months ( 12/2018)      2. Hypertension    His blood pressure is well controlled systolic less than 120 consistently.  Tolerating medications without any problem.  Continue the same  Reviewed out side BP readings.    3. Hyperlipidemia:  Doing well with statin , Lipids well controlled.  Cont same.  Repeat FLP at primary next visit    3. Generalized Anxiety    Improved and he is not requiring any Xanax or Ativan.    Time spent today 30 minutes, face-to-face and more than 50% time spent counseling and coordination of the care  Reviewed recent test results with the patient.    This note was dictated by utilizing dragon software  Cc: Primary care physician   RTC  May 2018

## 2018-12-11 ENCOUNTER — OFFICE VISIT (OUTPATIENT)
Dept: OTHER | Facility: CLINIC | Age: 58
End: 2018-12-11
Attending: INTERNAL MEDICINE
Payer: COMMERCIAL

## 2018-12-11 VITALS
OXYGEN SATURATION: 98 % | DIASTOLIC BLOOD PRESSURE: 86 MMHG | BODY MASS INDEX: 28 KG/M2 | SYSTOLIC BLOOD PRESSURE: 138 MMHG | HEART RATE: 76 BPM | WEIGHT: 178.8 LBS

## 2018-12-11 DIAGNOSIS — I77.70 ARTERIAL DISSECTION (H): ICD-10-CM

## 2018-12-11 DIAGNOSIS — E78.5 HYPERLIPIDEMIA LDL GOAL <70: ICD-10-CM

## 2018-12-11 DIAGNOSIS — I10 BENIGN ESSENTIAL HYPERTENSION: ICD-10-CM

## 2018-12-11 DIAGNOSIS — F41.1 GENERALIZED ANXIETY DISORDER: ICD-10-CM

## 2018-12-11 PROCEDURE — G0463 HOSPITAL OUTPT CLINIC VISIT: HCPCS

## 2018-12-11 PROCEDURE — 99214 OFFICE O/P EST MOD 30 MIN: CPT | Mod: ZP | Performed by: INTERNAL MEDICINE

## 2018-12-11 RX ORDER — AMLODIPINE BESYLATE 5 MG/1
5 TABLET ORAL DAILY
Qty: 90 TABLET | Refills: 3 | Status: SHIPPED | OUTPATIENT
Start: 2018-12-11 | End: 2019-11-07

## 2018-12-11 RX ORDER — ASPIRIN 81 MG/1
162 TABLET ORAL
Status: CANCELLED | OUTPATIENT
Start: 2018-12-11

## 2018-12-11 RX ORDER — HYDROCODONE BITARTRATE AND ACETAMINOPHEN 5; 325 MG/1; MG/1
1-2 TABLET ORAL EVERY 4 HOURS PRN
Qty: 30 TABLET | Refills: 0 | Status: SHIPPED | OUTPATIENT
Start: 2018-12-11 | End: 2019-11-07

## 2018-12-11 RX ORDER — HYDROCHLOROTHIAZIDE 25 MG/1
25 TABLET ORAL DAILY
Qty: 90 TABLET | Refills: 3 | Status: SHIPPED | OUTPATIENT
Start: 2018-12-11 | End: 2019-11-07

## 2018-12-11 NOTE — PROGRESS NOTES
CC: follow up visit, doing well. rarely using narco for pain. CTA in June 2017 healed dissection.  No abdominal pain.tolerating meds and well controlled BP and lipids.    HPI: Stanley Garcia is a 58 year old year old patient who receives primary care from Erica Collado here today for the follow-up visit and he was initially  admitted to the [facility] on September 10, 2015-September 16, 2015 for extensive isolated acute SMA dissection without malperfusion.  It was thought spontaneous and his workup was unremarkable and negative.    Later he  Gave a history of injury  one week before dissection( blunt injury to the abdomen) and he ran into edge of  heavy duty board during the Labor Day weekend ( one week before admission)     After the discharge from the hospital he is doing well, blood pressure is well controlled with multiple medications.  He is tolerating statin.  He stopped warfarin last year  and currently on aspirin 162 mg daily  He denies any chest pain, shortness of breath or palpitations.  No abdominal pain, no altered bowel movements.  No hematemesis or melena.    Recent CTA 6/2017 completely healed dissection of SMA.  Reviewed CTA with him.    PAST MEDICAL HISTORY  Past Medical History:   Diagnosis Date     Anxiety      HTN (hypertension)      Superior mesenteric artery thrombosis (H)     acute extensive isolated SMA disection without malperfusion 9/2015 admitted to [facility]       CURRENT MEDICATIONS  Current Outpatient Medications   Medication Sig Dispense Refill     amLODIPine (NORVASC) 5 MG tablet Take 1 tablet (5 mg) by mouth daily 90 tablet 3     aspirin EC 81 MG tablet Take 162 mg by mouth       atorvastatin (LIPITOR) 40 MG tablet Take 1 tablet (40 mg) by mouth At Bedtime 90 tablet 3     hydrochlorothiazide (HYDRODIURIL) 25 MG tablet Take 1 tablet (25 mg) by mouth daily 90 tablet 3     HYDROcodone-acetaminophen (NORCO) 5-325 MG tablet Take 1-2 tablets by mouth every 4 hours as needed for moderate to severe pain  30 tablet 0     labetalol (NORMODYNE) 200 MG tablet Take 1 tablet (200 mg) by mouth every 12 hours 180 tablet 3     multivitamin, therapeutic (THERA-VIT) TABS Take 1 tablet by mouth daily       Omega-3 Fatty Acids (FISH OIL PO)        order for DME Equipment being ordered: Blood pressure cuff 1 Device 0     ramipril (ALTACE) 10 MG capsule Take 1 capsule (10 mg) by mouth At Bedtime 90 capsule 3       PAST SURGICAL HISTORY:  Past Surgical History:   Procedure Laterality Date     ORTHOPEDIC SURGERY      left ankle       ALLERGIES   No Known Allergies    FAMILY HISTORY  Family History   Problem Relation Age of Onset     Cerebrovascular Disease Mother      Other Cancer Father         esophageal       VASCULAR FAMILY HISTORY  1st order relative with atherosclerotic PAD: No  1st order relative with AAA: No    SOCIAL HISTORY  Social History     Socioeconomic History     Marital status:      Spouse name: Kerry     Number of children: 3     Years of education: Not on file     Highest education level: Not on file   Social Needs     Financial resource strain: Not on file     Food insecurity - worry: Not on file     Food insecurity - inability: Not on file     Transportation needs - medical: Not on file     Transportation needs - non-medical: Not on file   Occupational History     Occupation: fork lift      Comment: city [employer]   Tobacco Use     Smoking status: Never Smoker     Smokeless tobacco: Former User     Tobacco comment: quit at age 29 ( chewed tobacco for 10 years??)   Substance and Sexual Activity     Alcohol use: Yes     Alcohol/week: 0.0 oz     Comment: occ beer      Drug use: No     Sexual activity: Yes     Partners: Female   Other Topics Concern     Not on file   Social History Narrative     Not on file       ROS:   Constitutional: No fever, chills, or sweats. No weight gain/loss   ENT: No visual disturbance, ear ache, epistaxis, sore throat  Allergies/Immunologic: Negative  Respiratory: No cough,  hemoptysia  Cardiovascular: As per HPI  GI: No nausea, vomiting, hematemesis, melena, or hematochezia  : No urinary frequency, dysuria, or hematuria  Integument: Negative  Psychiatric: Negative  Neuro: Negative  Endocrinology: Negative   Musculoskeletal: Negative  Vascular: No walking impairment, claudication, ischemic rest pain or nonhealing wounds    EXAM:  /86 (BP Location: Right arm, Patient Position: Chair, Cuff Size: Adult Regular)   Pulse 76   Wt 178 lb 12.8 oz (81.1 kg)   SpO2 98%   BMI 28.00 kg/m    In general, the patient is a pleasant male in no apparent distress.    HEENT: NC/AT.  PERRLA.  EOMI.  Sclerae white, not injected.  Nares clear.  Pharynx without erythema or exudate.  Dentition intact.    Neck: No adenopathy.  No thyromegaly. Carotids +2/2 bilaterally without bruits.  No jugular venous distension.   Heart: RRR. Normal S1, S2 splits physiologically. No murmur, rub, click, or gallop. The PMI is in the 5th ICS in the midclavicular line. There is no heave.    Lungs: CTA.  No ronchi, wheezes, rales.  No dullness to percussion.   Abdomen: Soft, nontender, nondistended. No organomegaly. No AAA.  No bruits.   Extremities: No clubbing, cyanosis, or edema.  No wounds. No varicose veins signs of chronic venous insufficiency.   Vascular: No bruits are noted.   Brachial Radial Ulnar Femoral Popliteal DP PT   Left 2/2 2/2 2/2 2/2 2/2 2/2 2/2   Right 2/2 2/2 2/2 2/2 2/2 2/2 2/2     Labs:  LIPID RESULTS:  Lab Results   Component Value Date    CHOL 145 10/21/2016    HDL 39 (L) 10/21/2016    LDL 83 10/21/2016    TRIG 115 10/21/2016    CHOLHDLRATIO 3.6 10/20/2015       LIVER ENZYME RESULTS:  Lab Results   Component Value Date    AST 20 06/14/2017    ALT 46 06/14/2017       CBC RESULTS:  Lab Results   Component Value Date    WBC 8.8 01/08/2016    RBC 4.99 01/08/2016    HGB 15.9 01/08/2016    HCT 43.7 01/08/2016    MCV 88 01/08/2016    MCH 31.9 01/08/2016    MCHC 36.4 01/08/2016    RDW 12.5 01/08/2016      01/08/2016       BMP RESULTS:  Lab Results   Component Value Date     06/14/2017    POTASSIUM 4.0 06/14/2017    CHLORIDE 106 06/14/2017    CO2 30 06/14/2017    ANIONGAP 4 06/14/2017     (H) 06/14/2017    BUN 17 06/14/2017    CR 0.84 06/14/2017    GFRESTIMATED >90  Non  GFR Calc   06/14/2017    GFRESTBLACK >90   GFR Calc   06/14/2017    ALONDRA 8.8 06/14/2017        A1C RESULTS:  Lab Results   Component Value Date    A1C 5.3 09/11/2015       Procedures:  CTA ANGIOGRAM ABDOMEN 10/13/2015 12:13 PM   HISTORY: Follow up superior mesenteric artery dissection.  COMPARISON: 9/14/2015.  TECHNIQUE: IV contrast CT angiography is performed through the abdomen  and pelvis utilizing 80 mL of Isovue-370. 3D reconstructions were  performed at an independent workstation.  FINDINGS: Again noted is a dissection of the proximal and mid  superior mesenteric artery beginning just beyond its origin and  extending to involve the origins of the first several jejunal  branches. Since the previous study there has been enlargement of the  false lumen with increased pooling of contrast via fenestrations into  the false lumen approximately 2 cm from the vessel origin. The  contrast collection pooling in this region now measures approximately  25 x 10 mm compared to 13 x 7 mm previously. As before, this is  associated with diffuse narrowing of the true lumen in this region,  although the vessel remains patent with filling of the distal jejunal  branches. As before, the remaining false lumen is unopacified and  presumably is thrombosed. The celiac trunk, the inferior mesenteric  artery, and the renal arteries remain patent and unchanged. The  abdominal aorta and the iliac vessels are normal and unchanged. There  is no evidence of mural edema to suggest bowel ischemia, although the  accuracy of this is diminished due to the lack of oral contrast.  Remainder of the abdomen and pelvis is  unchanged.  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 There  is persistent but stable narrowing of the true lumen of the proximal  and mid superior mesenteric artery which does not appear to be  progressively compromised despite the enlarging false lumen. The  remaining visceral vessels as well as the aortoiliac vessels remain  normal and unchanged. No evidence of mural edema to suggest bowel  ischemia on this nonoral contrast scan.  RENZO BURCIAGA MD        CTA ANGIOGRAM ABDOMEN AND PELVIS  5/19/2016 1:58 PM       HISTORY:  Follow up superior mesenteric artery dissection.     COMPARISON:  1/8/2016.     TECHNIQUE: IV contrast CT angiography is performed through the abdomen  and pelvis utilizing 80 mL of Isovue-370. 3D reconstructions were  performed at an independent workstation.     FINDINGS: Again noted is a localized dissection of the superior  mesenteric artery beginning approximately 2 cm from its origin. Since  the previous study there has been almost complete thrombosis of the  false lumen with improvement in the degree of extrinsic compression of  the true lumen which is now almost completely re-expanded to normal  caliber. There is now only minimal opacification of the false lumen  with contrast. There is no evidence of expansion of the dissection.     The celiac trunk, the inferior mesenteric artery, and the renal  arteries remain widely patent. The abdominal aorta and the common  iliac arteries normal.     There are stable multiple hepatic and right lower pole renal cysts.  There is a small stable nodule involving the medial aspect of the  right adrenal gland. There also appears to be mild stable thickening  or small nodule in the medial aspect of the left adrenal gland. These  are unchanged. Remainder of the abdomen and pelvis is negative except  for hypertrophic degenerative changes in the lumbar  spine.                                                                       IMPRESSION:    1. Interval partial thrombosis of the false lumen of the previous  superior mesenteric artery localized dissection with resultant  improvement in the luminal diameter of the true lumen with reduction  in extrinsic compression of the true lumen.  2. The remaining visceral vessels as well as the abdominal aorta and  visualized iliac arteries are normal.  3. Stable hepatic and right renal cyst.  4. Stable thickening or small nodule in the right adrenal gland and  stable mild thickening in the left adrenal gland.       RENZO BURCIAGA MD        CTA ANGIOGRAM ABDOMEN AND PELVIS  6/14/2017  1:16 PM     HISTORY: 57-year-old patient with history of superior mesenteric  arterial dissection.     COMPARISON: May 19, 2016.     TECHNIQUE: Multiplanar and multi formatted CTA images were obtained  from the lung bases through the abdomen and pelvis after the  uneventful administration of Isovue-370 intravenous contrast given for  a total of 80 mL. Radiation dose for this scan was reduced using  automated exposure control, adjustment of the mA and/or kV according  to patient size, or iterative reconstruction technique. 3-D  reformatted images were created at a separate workstation.     FINDINGS: The visible lung bases are clear. Heart size is normal. No  pleural effusion or pericardial effusion. Multiple hepatic cysts are  identified. The largest is in the left hepatic lobe measuring 4  Hounsfield units and 3.7 x 4.2 cm, relatively unchanged from a  previous exam. The gallbladder, spleen, adrenal glands, and pancreas  are unremarkable. Both kidneys are normally perfused and without  hydronephrosis. Right renal hypodensities again identified and  unchanged. No intraperitoneal fluid or air. Bladder is mostly  decompressed and unremarkable. Sigmoid colon diverticulosis, without  evidence of diverticulitis. Appendix is normal in size and  appearance  in the right lower quadrant. No acute osseous abnormality.     The abdominal aorta is normal in size and appearance. The celiac axis,  SMA, bilateral renal, the inferior mesenteric arteries are patent.  Both common iliac, internal iliac, external iliac, common femoral  arteries are patent. Small focal dissection is again visible in the  superior mesenteric artery. Widest diameter is up to 9 mm AP,  unchanged from previous exam. The appearance of a possible dissection  is best identified on axial CT imaging, though difficult to visualize  on sagittal imaging. In comparison to previous CT exam on January 8, 2016, suspect this is an essentially healed dissection without  residual stenosis.         IMPRESSION:  1. Patent visceral arteries. Specifically, previously identified  dissection in the superior mesenteric artery appears to have  essentially completely healed. Minimal residual irregularity, though  not flow-limiting and unchanged from most recent CT exam on May 19,  2016. No new or additional dissections.  2. Unchanged hepatic and right renal cysts.     EMIGDIO WISEMAN MD    CTA ANGIOGRAM ABDOMEN AND PELVIS  6/14/2017  1:16 PM     HISTORY: 57-year-old patient with history of superior mesenteric  arterial dissection.     COMPARISON: May 19, 2016.     TECHNIQUE: Multiplanar and multi formatted CTA images were obtained  from the lung bases through the abdomen and pelvis after the  uneventful administration of Isovue-370 intravenous contrast given for  a total of 80 mL. Radiation dose for this scan was reduced using  automated exposure control, adjustment of the mA and/or kV according  to patient size, or iterative reconstruction technique. 3-D  reformatted images were created at a separate workstation.     FINDINGS: The visible lung bases are clear. Heart size is normal. No  pleural effusion or pericardial effusion. Multiple hepatic cysts are  identified. The largest is in the left hepatic lobe  measuring 4  Hounsfield units and 3.7 x 4.2 cm, relatively unchanged from a  previous exam. The gallbladder, spleen, adrenal glands, and pancreas  are unremarkable. Both kidneys are normally perfused and without  hydronephrosis. Right renal hypodensities again identified and  unchanged. No intraperitoneal fluid or air. Bladder is mostly  decompressed and unremarkable. Sigmoid colon diverticulosis, without  evidence of diverticulitis. Appendix is normal in size and appearance  in the right lower quadrant. No acute osseous abnormality.     The abdominal aorta is normal in size and appearance. The celiac axis,  SMA, bilateral renal, the inferior mesenteric arteries are patent.  Both common iliac, internal iliac, external iliac, common femoral  arteries are patent. Small focal dissection is again visible in the  superior mesenteric artery. Widest diameter is up to 9 mm AP,  unchanged from previous exam. The appearance of a possible dissection  is best identified on axial CT imaging, though difficult to visualize  on sagittal imaging. In comparison to previous CT exam on January 8, 2016, suspect this is an essentially healed dissection without  residual stenosis.         IMPRESSION:  1. Patent visceral arteries. Specifically, previously identified  dissection in the superior mesenteric artery appears to have  essentially completely healed. Minimal residual irregularity, though  not flow-limiting and unchanged from most recent CT exam on May 19,  2016. No new or additional dissections.  2. Unchanged hepatic and right renal cysts.     EMIGDIO WISEMAN MD    Assessment and Plan:     1. Extensive isolated acute SMA dissection without malperfusion 9/10/15 :( this appears related to blunt injury to the abdomen, ran into edge of the board  one week before the admission ??) improved on recent CTA of abdomen 5/19/16 and healed dissection on CTA 6/2017 as delineated above.    Doing well since last admission.    Currently on  multiple BP meds with excellent control  He denies any CP, Abdominal pain, no weight loss or postprandial issues.    He has no family history of connective tissue disorders and his clinical exam did not fit with Marfan's, EDS or LDS. In addition, his rheumatoid factor, TRINA, and vasculitis panel were all negative.  He is currently  on multiple antihypertensives, including amlodipine, labetalol, hydrochlorothiazide, and ramipril. He was  initiated Lipitor 40 mg daily for pleiotropic benefit.No abdominal pain, hematemesis or melena.  His blood pressure is well controlled.     He underwent repeat CTA in 6/2017 As delineated above, completely healed dissection.    Reviewed recent lab results in care everywhere , all of them are good. Due for lipid panle in 3/2019    Plan:  Continue current medications  Avoid lifting heavy weights  Continue  aspirin 162 mg daily with food.  Maintain good BP  Plan for CTA next year before visit  or if he decides to do before dec 31st, we will order CTA of abdomen.( he will call us and let us know)      2. Hypertension    His blood pressure is well controlled systolic less than 120 consistently.  Tolerating medications without any problem.  Continue the same  Reviewed out side BP readings.    3. Hyperlipidemia:  Doing well with statin , Lipids well controlled.  Cont same.  Repeat FLP at primary next visit    3. Generalized Anxiety    Improved and he is not requiring any Xanax or Ativan.    Time spent today 30 minutes, face-to-face and more than 50% time spent counseling and coordination of the care  Reviewed recent test results with the patient.    This note was dictated by utilizing dragon software  Cc: Primary care physician   RTC 6 months

## 2018-12-11 NOTE — PATIENT INSTRUCTIONS
Continue current medications    Reduce coffee intake    Please get fasting lipid panel test in march 2019 at primary.    See me in 6 months

## 2018-12-11 NOTE — NURSING NOTE
"Stanley Garcia is a 58 year old male who presents for:  Chief Complaint   Patient presents with     RECHECK     yearly follow up        Vitals:    Vitals:    12/11/18 0934   BP: 138/86   BP Location: Right arm   Patient Position: Chair   Cuff Size: Adult Regular   Pulse: 76   SpO2: 98%   Weight: 178 lb 12.8 oz (81.1 kg)       BMI:  Estimated body mass index is 28 kg/m  as calculated from the following:    Height as of 1/8/16: 5' 7\" (1.702 m).    Weight as of this encounter: 178 lb 12.8 oz (81.1 kg).    Pain Score:  Data Unavailable        Domonique Martino MA    "

## 2019-11-07 ENCOUNTER — OFFICE VISIT (OUTPATIENT)
Dept: OTHER | Facility: CLINIC | Age: 59
End: 2019-11-07
Attending: INTERNAL MEDICINE
Payer: COMMERCIAL

## 2019-11-07 VITALS
DIASTOLIC BLOOD PRESSURE: 79 MMHG | HEART RATE: 88 BPM | BODY MASS INDEX: 27.13 KG/M2 | SYSTOLIC BLOOD PRESSURE: 131 MMHG | RESPIRATION RATE: 16 BRPM | HEIGHT: 68 IN | WEIGHT: 179 LBS | OXYGEN SATURATION: 97 %

## 2019-11-07 DIAGNOSIS — E78.5 HYPERLIPIDEMIA LDL GOAL <70: ICD-10-CM

## 2019-11-07 DIAGNOSIS — I77.70 ARTERIAL DISSECTION (H): Primary | ICD-10-CM

## 2019-11-07 DIAGNOSIS — I10 BENIGN ESSENTIAL HYPERTENSION: ICD-10-CM

## 2019-11-07 PROCEDURE — 99214 OFFICE O/P EST MOD 30 MIN: CPT | Mod: ZP | Performed by: INTERNAL MEDICINE

## 2019-11-07 PROCEDURE — G0463 HOSPITAL OUTPT CLINIC VISIT: HCPCS

## 2019-11-07 RX ORDER — AMLODIPINE BESYLATE 5 MG/1
5 TABLET ORAL DAILY
Qty: 90 TABLET | Refills: 3 | Status: SHIPPED | OUTPATIENT
Start: 2019-11-07 | End: 2020-11-30

## 2019-11-07 RX ORDER — HYDROCODONE BITARTRATE AND ACETAMINOPHEN 5; 325 MG/1; MG/1
1-2 TABLET ORAL EVERY 4 HOURS PRN
Qty: 30 TABLET | Refills: 0 | Status: SHIPPED | OUTPATIENT
Start: 2019-11-07 | End: 2021-01-20

## 2019-11-07 RX ORDER — HYDROCHLOROTHIAZIDE 25 MG/1
25 TABLET ORAL DAILY
Qty: 90 TABLET | Refills: 3 | Status: SHIPPED | OUTPATIENT
Start: 2019-11-07 | End: 2020-11-30

## 2019-11-07 RX ORDER — LABETALOL 200 MG/1
200 TABLET, FILM COATED ORAL EVERY 12 HOURS
Qty: 180 TABLET | Refills: 3 | Status: SHIPPED | OUTPATIENT
Start: 2019-11-07 | End: 2021-01-20

## 2019-11-07 RX ORDER — ATORVASTATIN CALCIUM 40 MG/1
40 TABLET, FILM COATED ORAL AT BEDTIME
Qty: 90 TABLET | Refills: 3 | Status: SHIPPED | OUTPATIENT
Start: 2019-11-07 | End: 2021-01-20

## 2019-11-07 RX ORDER — RAMIPRIL 10 MG/1
10 CAPSULE ORAL AT BEDTIME
Qty: 90 CAPSULE | Refills: 3 | Status: SHIPPED | OUTPATIENT
Start: 2019-11-07 | End: 2021-02-22

## 2019-11-07 ASSESSMENT — MIFFLIN-ST. JEOR: SCORE: 1596.44

## 2019-11-07 NOTE — PATIENT INSTRUCTIONS
Continue current medications    See me in 6 months    Follow up with primary and get lipids, CMP and other labs etc.

## 2019-11-07 NOTE — PROGRESS NOTES
CC: follow up visit, doing well. rarely using narco for pain. CTA in June 2017 healed dissection.  No abdominal pain.tolerating meds and well controlled BP and lipids.  Few  months ago all the labs good at primary     HPI: Stanley Garcia is a 59  year old year old patient who receives primary care from Erica Collado here today for the follow-up visit and he was initially  admitted to the [facility] on September 10, 2015-September 16, 2015 for extensive isolated acute SMA dissection without malperfusion.  It was thought spontaneous and his workup was unremarkable and negative.    Later he  Gave a history of injury  one week before dissection( blunt injury to the abdomen) and he ran into edge of  heavy duty board during the Labor Day weekend ( one week before admission)     After the discharge from the hospital he is doing well, blood pressure is well controlled with multiple medications.  He is tolerating statin.  He stopped warfarin last year  and currently on aspirin 162 mg daily  He denies any chest pain, shortness of breath or palpitations.  No abdominal pain, no altered bowel movements.  No hematemesis or melena.    Previous  CTA 6/2017 completely healed dissection of SMA.  Reviewed CTA with him.    PAST MEDICAL HISTORY  Past Medical History:   Diagnosis Date     Anxiety      HTN (hypertension)      Superior mesenteric artery thrombosis (H)     acute extensive isolated SMA disection without malperfusion 9/2015 admitted to [facility]       CURRENT MEDICATIONS  Current Outpatient Medications   Medication Sig Dispense Refill     amLODIPine (NORVASC) 5 MG tablet Take 1 tablet (5 mg) by mouth daily 90 tablet 3     aspirin EC 81 MG tablet Take 162 mg by mouth       atorvastatin (LIPITOR) 40 MG tablet Take 1 tablet (40 mg) by mouth At Bedtime 90 tablet 3     hydrochlorothiazide (HYDRODIURIL) 25 MG tablet Take 1 tablet (25 mg) by mouth daily 90 tablet 3     HYDROcodone-acetaminophen (NORCO) 5-325 MG tablet Take 1-2 tablets by mouth  every 4 hours as needed for moderate to severe pain 30 tablet 0     labetalol (NORMODYNE) 200 MG tablet Take 1 tablet (200 mg) by mouth every 12 hours 180 tablet 3     multivitamin, therapeutic (THERA-VIT) TABS Take 1 tablet by mouth daily       Omega-3 Fatty Acids (FISH OIL PO)        order for DME Equipment being ordered: Blood pressure cuff 1 Device 0     ramipril (ALTACE) 10 MG capsule Take 1 capsule (10 mg) by mouth At Bedtime 90 capsule 3       PAST SURGICAL HISTORY:  Past Surgical History:   Procedure Laterality Date     ORTHOPEDIC SURGERY      left ankle       ALLERGIES   No Known Allergies    FAMILY HISTORY  Family History   Problem Relation Age of Onset     Cerebrovascular Disease Mother      Other Cancer Father         esophageal       VASCULAR FAMILY HISTORY  1st order relative with atherosclerotic PAD: No  1st order relative with AAA: No    SOCIAL HISTORY  Social History     Socioeconomic History     Marital status:      Spouse name: Kerry     Number of children: 3     Years of education: Not on file     Highest education level: Not on file   Occupational History     Occupation: [employer]      Comment: [employer]   Social Needs     Financial resource strain: Not on file     Food insecurity:     Worry: Not on file     Inability: Not on file     Transportation needs:     Medical: Not on file     Non-medical: Not on file   Tobacco Use     Smoking status: Never Smoker     Smokeless tobacco: Former User     Tobacco comment: quit at age 29 ( chewed tobacco for 10 years??)   Substance and Sexual Activity     Alcohol use: Yes     Alcohol/week: 0.0 standard drinks     Comment: occ beer      Drug use: No     Sexual activity: Yes     Partners: Female   Lifestyle     Physical activity:     Days per week: Not on file     Minutes per session: Not on file     Stress: Not on file   Relationships     Social connections:     Talks on phone: Not on file     Gets together: Not on file     Attends Quaker service:  "Not on file     Active member of club or organization: Not on file     Attends meetings of clubs or organizations: Not on file     Relationship status: Not on file     Intimate partner violence:     Fear of current or ex partner: Not on file     Emotionally abused: Not on file     Physically abused: Not on file     Forced sexual activity: Not on file   Other Topics Concern     Not on file   Social History Narrative     Not on file       ROS:   Constitutional: No fever, chills, or sweats. No weight gain/loss   ENT: No visual disturbance, ear ache, epistaxis, sore throat  Allergies/Immunologic: Negative  Respiratory: No cough, hemoptysia  Cardiovascular: As per HPI  GI: No nausea, vomiting, hematemesis, melena, or hematochezia  : No urinary frequency, dysuria, or hematuria  Integument: Negative  Psychiatric: Negative  Neuro: Negative  Endocrinology: Negative   Musculoskeletal: Negative  Vascular: No walking impairment, claudication, ischemic rest pain or nonhealing wounds    EXAM:  /79 (BP Location: Right arm, Patient Position: Chair, Cuff Size: Adult Regular)   Pulse 88   Resp 16   Ht 5' 8\" (1.727 m)   Wt 179 lb (81.2 kg)   SpO2 97%   BMI 27.22 kg/m    In general, the patient is a pleasant male in no apparent distress.    HEENT: NC/AT.  PERRLA.  EOMI.  Sclerae white, not injected.  Nares clear.  Pharynx without erythema or exudate.  Dentition intact.    Neck: No adenopathy.  No thyromegaly. Carotids +2/2 bilaterally without bruits.  No jugular venous distension.   Heart: RRR. Normal S1, S2 splits physiologically. No murmur, rub, click, or gallop. The PMI is in the 5th ICS in the midclavicular line. There is no heave.    Lungs: CTA.  No ronchi, wheezes, rales.  No dullness to percussion.   Abdomen: Soft, nontender, nondistended. No organomegaly. No AAA.  No bruits.   Extremities: No clubbing, cyanosis, or edema.  No wounds. No varicose veins signs of chronic venous insufficiency.   Vascular: No bruits " are noted.   Brachial Radial Ulnar Femoral Popliteal DP PT   Left 2/2 2/2 2/2 2/2 2/2 2/2 2/2   Right 2/2 2/2 2/2 2/2 2/2 2/2 2/2     Labs:  LIPID RESULTS:  Lab Results   Component Value Date    CHOL 145 10/21/2016    HDL 39 (L) 10/21/2016    LDL 83 10/21/2016    TRIG 115 10/21/2016    CHOLHDLRATIO 3.6 10/20/2015       LIVER ENZYME RESULTS:  Lab Results   Component Value Date    AST 20 06/14/2017    ALT 46 06/14/2017       CBC RESULTS:  Lab Results   Component Value Date    WBC 8.8 01/08/2016    RBC 4.99 01/08/2016    HGB 15.9 01/08/2016    HCT 43.7 01/08/2016    MCV 88 01/08/2016    MCH 31.9 01/08/2016    MCHC 36.4 01/08/2016    RDW 12.5 01/08/2016     01/08/2016       BMP RESULTS:  Lab Results   Component Value Date     06/14/2017    POTASSIUM 4.0 06/14/2017    CHLORIDE 106 06/14/2017    CO2 30 06/14/2017    ANIONGAP 4 06/14/2017     (H) 06/14/2017    BUN 17 06/14/2017    CR 0.84 06/14/2017    GFRESTIMATED >90  Non  GFR Calc   06/14/2017    GFRESTBLACK >90   GFR Calc   06/14/2017    ALONDRA 8.8 06/14/2017        A1C RESULTS:  Lab Results   Component Value Date    A1C 5.3 09/11/2015       Procedures:  CTA ANGIOGRAM ABDOMEN 10/13/2015 12:13 PM   HISTORY: Follow up superior mesenteric artery dissection.  COMPARISON: 9/14/2015.  TECHNIQUE: IV contrast CT angiography is performed through the abdomen  and pelvis utilizing 80 mL of Isovue-370. 3D reconstructions were  performed at an independent workstation.  FINDINGS: Again noted is a dissection of the proximal and mid  superior mesenteric artery beginning just beyond its origin and  extending to involve the origins of the first several jejunal  branches. Since the previous study there has been enlargement of the  false lumen with increased pooling of contrast via fenestrations into  the false lumen approximately 2 cm from the vessel origin. The  contrast collection pooling in this region now measures approximately  25 x  10 mm compared to 13 x 7 mm previously. As before, this is  associated with diffuse narrowing of the true lumen in this region,  although the vessel remains patent with filling of the distal jejunal  branches. As before, the remaining false lumen is unopacified and  presumably is thrombosed. The celiac trunk, the inferior mesenteric  artery, and the renal arteries remain patent and unchanged. The  abdominal aorta and the iliac vessels are normal and unchanged. There  is no evidence of mural edema to suggest bowel ischemia, although the  accuracy of this is diminished due to the lack of oral contrast.  Remainder of the abdomen and pelvis is unchanged.  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 There  is persistent but stable narrowing of the true lumen of the proximal  and mid superior mesenteric artery which does not appear to be  progressively compromised despite the enlarging false lumen. The  remaining visceral vessels as well as the aortoiliac vessels remain  normal and unchanged. No evidence of mural edema to suggest bowel  ischemia on this nonoral contrast scan.  RENZO BURCIAGA MD        CTA ANGIOGRAM ABDOMEN AND PELVIS  5/19/2016 1:58 PM       HISTORY:  Follow up superior mesenteric artery dissection.     COMPARISON:  1/8/2016.     TECHNIQUE: IV contrast CT angiography is performed through the abdomen  and pelvis utilizing 80 mL of Isovue-370. 3D reconstructions were  performed at an independent workstation.     FINDINGS: Again noted is a localized dissection of the superior  mesenteric artery beginning approximately 2 cm from its origin. Since  the previous study there has been almost complete thrombosis of the  false lumen with improvement in the degree of extrinsic compression of  the true lumen which is now almost completely re-expanded to normal  caliber. There is now only minimal opacification of the  false lumen  with contrast. There is no evidence of expansion of the dissection.     The celiac trunk, the inferior mesenteric artery, and the renal  arteries remain widely patent. The abdominal aorta and the common  iliac arteries normal.     There are stable multiple hepatic and right lower pole renal cysts.  There is a small stable nodule involving the medial aspect of the  right adrenal gland. There also appears to be mild stable thickening  or small nodule in the medial aspect of the left adrenal gland. These  are unchanged. Remainder of the abdomen and pelvis is negative except  for hypertrophic degenerative changes in the lumbar spine.                                                                       IMPRESSION:    1. Interval partial thrombosis of the false lumen of the previous  superior mesenteric artery localized dissection with resultant  improvement in the luminal diameter of the true lumen with reduction  in extrinsic compression of the true lumen.  2. The remaining visceral vessels as well as the abdominal aorta and  visualized iliac arteries are normal.  3. Stable hepatic and right renal cyst.  4. Stable thickening or small nodule in the right adrenal gland and  stable mild thickening in the left adrenal gland.       RENZO BURCIAGA MD        CTA ANGIOGRAM ABDOMEN AND PELVIS  6/14/2017  1:16 PM     HISTORY: 57-year-old patient with history of superior mesenteric  arterial dissection.     COMPARISON: May 19, 2016.     TECHNIQUE: Multiplanar and multi formatted CTA images were obtained  from the lung bases through the abdomen and pelvis after the  uneventful administration of Isovue-370 intravenous contrast given for  a total of 80 mL. Radiation dose for this scan was reduced using  automated exposure control, adjustment of the mA and/or kV according  to patient size, or iterative reconstruction technique. 3-D  reformatted images were created at a separate workstation.     FINDINGS: The visible lung  bases are clear. Heart size is normal. No  pleural effusion or pericardial effusion. Multiple hepatic cysts are  identified. The largest is in the left hepatic lobe measuring 4  Hounsfield units and 3.7 x 4.2 cm, relatively unchanged from a  previous exam. The gallbladder, spleen, adrenal glands, and pancreas  are unremarkable. Both kidneys are normally perfused and without  hydronephrosis. Right renal hypodensities again identified and  unchanged. No intraperitoneal fluid or air. Bladder is mostly  decompressed and unremarkable. Sigmoid colon diverticulosis, without  evidence of diverticulitis. Appendix is normal in size and appearance  in the right lower quadrant. No acute osseous abnormality.     The abdominal aorta is normal in size and appearance. The celiac axis,  SMA, bilateral renal, the inferior mesenteric arteries are patent.  Both common iliac, internal iliac, external iliac, common femoral  arteries are patent. Small focal dissection is again visible in the  superior mesenteric artery. Widest diameter is up to 9 mm AP,  unchanged from previous exam. The appearance of a possible dissection  is best identified on axial CT imaging, though difficult to visualize  on sagittal imaging. In comparison to previous CT exam on January 8, 2016, suspect this is an essentially healed dissection without  residual stenosis.         IMPRESSION:  1. Patent visceral arteries. Specifically, previously identified  dissection in the superior mesenteric artery appears to have  essentially completely healed. Minimal residual irregularity, though  not flow-limiting and unchanged from most recent CT exam on May 19,  2016. No new or additional dissections.  2. Unchanged hepatic and right renal cysts.     EMIGDIO WISEMAN MD    CTA ANGIOGRAM ABDOMEN AND PELVIS  6/14/2017  1:16 PM     HISTORY: 57-year-old patient with history of superior mesenteric  arterial dissection.     COMPARISON: May 19, 2016.     TECHNIQUE: Multiplanar and  multi formatted CTA images were obtained  from the lung bases through the abdomen and pelvis after the  uneventful administration of Isovue-370 intravenous contrast given for  a total of 80 mL. Radiation dose for this scan was reduced using  automated exposure control, adjustment of the mA and/or kV according  to patient size, or iterative reconstruction technique. 3-D  reformatted images were created at a separate workstation.     FINDINGS: The visible lung bases are clear. Heart size is normal. No  pleural effusion or pericardial effusion. Multiple hepatic cysts are  identified. The largest is in the left hepatic lobe measuring 4  Hounsfield units and 3.7 x 4.2 cm, relatively unchanged from a  previous exam. The gallbladder, spleen, adrenal glands, and pancreas  are unremarkable. Both kidneys are normally perfused and without  hydronephrosis. Right renal hypodensities again identified and  unchanged. No intraperitoneal fluid or air. Bladder is mostly  decompressed and unremarkable. Sigmoid colon diverticulosis, without  evidence of diverticulitis. Appendix is normal in size and appearance  in the right lower quadrant. No acute osseous abnormality.     The abdominal aorta is normal in size and appearance. The celiac axis,  SMA, bilateral renal, the inferior mesenteric arteries are patent.  Both common iliac, internal iliac, external iliac, common femoral  arteries are patent. Small focal dissection is again visible in the  superior mesenteric artery. Widest diameter is up to 9 mm AP,  unchanged from previous exam. The appearance of a possible dissection  is best identified on axial CT imaging, though difficult to visualize  on sagittal imaging. In comparison to previous CT exam on January 8, 2016, suspect this is an essentially healed dissection without  residual stenosis.         IMPRESSION:  1. Patent visceral arteries. Specifically, previously identified  dissection in the superior mesenteric artery appears to  have  essentially completely healed. Minimal residual irregularity, though  not flow-limiting and unchanged from most recent CT exam on May 19,  2016. No new or additional dissections.  2. Unchanged hepatic and right renal cysts.     EMIGDIO WISEMAN MD    Assessment and Plan:     1. Extensive isolated acute SMA dissection without malperfusion 9/10/15 :( this appears related to blunt injury to the abdomen, ran into edge of the board  one week before the admission ??) improved on recent CTA of abdomen 5/19/16 and healed dissection on CTA 6/2017 as delineated above.    Doing well with current meds    Currently on multiple BP meds with excellent control  He denies any CP, Abdominal pain, no weight loss or postprandial issues.    He has no family history of connective tissue disorders and his clinical exam did not fit with Marfan's, EDS or LDS. In addition, his rheumatoid factor, TRIAN, and vasculitis panel were all negative.  He is currently  on multiple antihypertensives, including amlodipine, labetalol, hydrochlorothiazide, and ramipril. He was  initiated Lipitor 40 mg daily for pleiotropic benefit.No abdominal pain, hematemesis or melena.  His blood pressure is well controlled.     He underwent repeat CTA in 6/2017 As delineated above, completely healed dissection.    Reviewed recent lab results in care everywhere , all of them are good.     Plan:  Continue current medications  Avoid lifting heavy weights  Continue  aspirin 162 mg daily with food.  Maintain good BP  Will plan for CTA if clinical status changes.      2. Hypertension    His blood pressure is well controlled     Tolerating medications without any problem.  Continue the same  Reviewed out side BP readings.    3. Hyperlipidemia:  Doing well with statin , Lipids well controlled.  Cont same.  Repeat FLP at primary next visit      Time spent today 25  minutes, face-to-face and more than 50% time spent counseling and coordination of the care  Reviewed recent  test results with the patient.    This note was dictated by utilizing dragon software  Cc: Primary care physician   RTC 6 months

## 2019-11-07 NOTE — PROGRESS NOTES
"Stanley Garcia is a 59 year old male who presents for:  Chief Complaint   Patient presents with     RECHECK     follow up *mamtaw        Vitals:    Vitals:    11/07/19 1427   BP: 131/79   BP Location: Right arm   Patient Position: Chair   Cuff Size: Adult Regular   Pulse: 88   Resp: 16   SpO2: 97%   Weight: 179 lb (81.2 kg)   Height: 5' 8\" (1.727 m)       BMI:  Estimated body mass index is 27.22 kg/m  as calculated from the following:    Height as of this encounter: 5' 8\" (1.727 m).    Weight as of this encounter: 179 lb (81.2 kg).    Pain Score:  Data Unavailable        Lashon Odonnell CMA    "

## 2019-11-08 ENCOUNTER — TELEPHONE (OUTPATIENT)
Dept: OTHER | Facility: CLINIC | Age: 59
End: 2019-11-08

## 2019-11-08 NOTE — TELEPHONE ENCOUNTER
"[facility] pharmacist called stating that they have a new policy where hydrocodone/acetaminophen tablet prescriptions must include a statement stating maximum of 6 tablets per day.    The current Rx is for 1-2 tablets every 4 hours, which would total 12 tablets in 24 hours.    Statement verbally given to pharmacist to add statement \"maximum of 6 tablets per day\".    Faith Mckenna RN BSN  [facility]      "

## 2020-02-01 ENCOUNTER — HOSPITAL ENCOUNTER (EMERGENCY)
Facility: CLINIC | Age: 60
Discharge: HOME | End: 2020-02-01
Attending: PHYSICIAN ASSISTANT | Admitting: PHYSICIAN ASSISTANT
Payer: COMMERCIAL

## 2020-02-01 VITALS
BODY MASS INDEX: 27.28 KG/M2 | SYSTOLIC BLOOD PRESSURE: 124 MMHG | RESPIRATION RATE: 18 BRPM | DIASTOLIC BLOOD PRESSURE: 79 MMHG | WEIGHT: 180 LBS | TEMPERATURE: 97.8 F | OXYGEN SATURATION: 95 % | HEIGHT: 68 IN

## 2020-02-01 DIAGNOSIS — M79.652 PAIN OF LEFT THIGH: ICD-10-CM

## 2020-02-01 PROCEDURE — 25000132 ZZH RX MED GY IP 250 OP 250 PS 637: Performed by: PHYSICIAN ASSISTANT

## 2020-02-01 PROCEDURE — 25000132 ZZH RX MED GY IP 250 OP 250 PS 637: Performed by: EMERGENCY MEDICINE

## 2020-02-01 PROCEDURE — 99283 EMERGENCY DEPT VISIT LOW MDM: CPT

## 2020-02-01 RX ORDER — OXYCODONE HYDROCHLORIDE 5 MG/1
5 TABLET ORAL EVERY 6 HOURS PRN
Qty: 12 TABLET | Refills: 0 | Status: SHIPPED | OUTPATIENT
Start: 2020-02-01 | End: 2021-01-20

## 2020-02-01 RX ORDER — IBUPROFEN 600 MG/1
600 TABLET, FILM COATED ORAL ONCE
Status: COMPLETED | OUTPATIENT
Start: 2020-02-01 | End: 2020-02-01

## 2020-02-01 RX ORDER — OXYCODONE HYDROCHLORIDE 5 MG/1
10 TABLET ORAL ONCE
Status: COMPLETED | OUTPATIENT
Start: 2020-02-01 | End: 2020-02-01

## 2020-02-01 RX ADMIN — OXYCODONE HYDROCHLORIDE 10 MG: 5 TABLET ORAL at 22:48

## 2020-02-01 RX ADMIN — IBUPROFEN 600 MG: 600 TABLET ORAL at 20:58

## 2020-02-01 ASSESSMENT — ENCOUNTER SYMPTOMS
BACK PAIN: 0
NUMBNESS: 0
COLOR CHANGE: 0

## 2020-02-01 ASSESSMENT — MIFFLIN-ST. JEOR: SCORE: 1595.97

## 2020-02-01 NOTE — ED AVS SNAPSHOT
Emergency Department  [address] [place] [id number]  Phone:  [phone]  Fax:  [phone]                                    Stanley Garcia   MRN: [id number]    Department:   Emergency Department   Date of Visit:  2/1/2020           After Visit Summary Signature Page    I have received my discharge instructions, and my questions have been answered. I have discussed any challenges I see with this plan with the nurse or doctor.    ..........................................................................................................................................  Patient/Patient Representative Signature      ..........................................................................................................................................  Patient Representative Print Name and Relationship to Patient    ..................................................               ................................................  Date                                   Time    ..........................................................................................................................................  Reviewed by Signature/Title    ...................................................              ..............................................  Date                                               Time          22EPIC Rev 08/18

## 2020-02-02 NOTE — DISCHARGE INSTRUCTIONS
Use Tylenol and ibuprofen for pain.  Use oxycodone for breakthrough pain. This is sedating. Do not drive after taking this medication.

## 2020-02-02 NOTE — ED TRIAGE NOTES
Pt running to get dog out of water and felt like hyper extended his L thigh with tearing type pain in back of leg.

## 2020-02-02 NOTE — ED PROVIDER NOTES
"  History     Chief Complaint:  Leg Injury    HPI   Stanley Garcia is a 60 year old male with a history of hypertension, hyperlipidemia, and prediabetes among others who presents to the emergency department today for evaluation of a left leg injury. The [place] chasing his dog through some deep snow when he noted a rapid onset tearing type pain to his posterior thigh and buttock that \"dropped [him] to the ground.\" He notes persistence and a worsening of his pain, adding that it seemed to be exacerbated with extending his left lower extremity and is now inhibiting his ability to ambulate independently. He denies any numbness, tingling, bruising, back pain, or recent antibiotic use.     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  Smoking Status: Never Smoker  Smokeless Tobacco: Former User  Alcohol Use: Positive  Drug Use: Negative  PCP: Erica Collado  Marital Status:        Review of Systems   Musculoskeletal: Positive for gait problem. Negative for back pain.        Left thigh, buttock pain   Skin: Negative for color change.   Neurological: Negative for numbness.   All other systems reviewed and are negative.      Physical Exam     Patient Vitals for the past 24 hrs:   BP Temp Temp src Heart Rate Resp SpO2 Height Weight   02/01/20 2053 124/79 97.8  F (36.6  C) Oral 107 18 95 % " "1.727 m (5' 8\") 81.6 kg (180 lb)     Physical Exam  Constitutional: Pleasant. Cooperative.   Eyes: Pupils equally round and reactive  HENT: Head is normal in appearance. Oropharynx is normal with moist mucus membranes.  Cardiovascular: Regular rate and rhythm and without murmurs.  Respiratory: Normal respiratory effort, lungs are clear bilaterally.  Musculoskeletal: left lower extremity: Palpable bulging mass to proximal posterolateral thigh with minimal tenderness. Non-tender elsewhere. Full ROM. 2+ PT pulses bilaterally.  Skin: Normal, without rash. No overlying ecchymoses.  Neurologic: Cranial nerves grossly intact, normal cognition, no focal deficits. Alert and oriented x 3. Sensation intact distally.  Psychiatric: Normal affect.  Nursing notes and vital signs reviewed.    Emergency Department Course     Interventions:  Medications   ibuprofen (ADVIL/MOTRIN) tablet 600 mg (600 mg Oral Given 2/1/20 2058)   oxyCODONE (ROXICODONE) tablet 10 mg (10 mg Oral Given 2/1/20 2248)     Emergency Department Course:    2115 Nursing notes and vitals reviewed.    2131 I performed an exam of the patient as documented above.     2201 Patient rechecked and updated.     The patient was provided crutches by ERT.      Findings and plan explained to the patient. Patient discharged home with instructions regarding supportive care, medications, and reasons to return. The importance of close follow-up was reviewed. The patient was prescribed oxycodone.    Impression & Plan      Medical Decision Making:  Stanley Garcia is a 60 year old male who presents to the emergency department for evaluation of the left lower extremity pain.  Patient was chasing after his dog when he felt a tearing type pain in his posterior lateral thigh and has been unable to apply weight or walk without extreme pain since that time.  See HPI as above for additional details.  Vitals and physical exam as above.  Differential includes fracture, strain, DVT, muscle " tear, among others.  Clinically suspect hamstring muscle tear at this time.  Discussed use of imaging in this diagnosis, and patient would like to defer on imaging at this time as I doubt fracture and we discussed that ultrasound would likely be of little benefit as patient will likely require MRI for more in-depth evaluation when seen by orthopedics in the near future.  Patient was comfortable with this plan.  Patient has knee immobilizer at home and suggested that this may provide him some comfort.  He was provided crutches here and advised to have supported weightbearing in the meantime.  Advised ongoing exercising of the lower extremity to minimize his risk of DVT, which I doubt at this time.  Patient provided oxycodone here for pain and will be sent home with short course of oxycodone in the meantime.  Narcotic precautions provided. Referral for orthopedics provided. Discussed reasons to return. All questions answered. Patient discharged to home in stable condition.    Diagnosis:    ICD-10-CM    1. Pain of left thigh M79.652      Disposition:   The patient is discharged to home.    Discharge Medications:  New Prescriptions    OXYCODONE (ROXICODONE) 5 MG TABLET    Take 1 tablet (5 mg) by mouth every 6 hours as needed for pain     Scribe Disclosure:  I, Nori Lyn, am serving as a scribe at 9:37 PM on 2/1/2020 to document services personally performed by Michael Schreiber PA-C based on my observations and the provider's statements to me.     EMERGENCY DEPARTMENT       Michael Schreiber PA-C  02/01/20 2851

## 2020-06-24 ENCOUNTER — TELEPHONE (OUTPATIENT)
Dept: OTHER | Facility: CLINIC | Age: 60
End: 2020-06-24

## 2020-06-24 NOTE — TELEPHONE ENCOUNTER
2nd and final attempt to schedule Stanley for a virtual follow up with Dr. Romero.    No Labs or Imaging needed.       Riana SULLIVAN

## 2020-11-28 DIAGNOSIS — I10 BENIGN ESSENTIAL HYPERTENSION: ICD-10-CM

## 2020-11-30 RX ORDER — AMLODIPINE BESYLATE 5 MG/1
5 TABLET ORAL DAILY
Qty: 90 TABLET | Refills: 0 | Status: SHIPPED | OUTPATIENT
Start: 2020-11-30 | End: 2021-01-20

## 2020-11-30 RX ORDER — HYDROCHLOROTHIAZIDE 25 MG/1
25 TABLET ORAL DAILY
Qty: 90 TABLET | Refills: 0 | Status: SHIPPED | OUTPATIENT
Start: 2020-11-30 | End: 2021-01-20

## 2020-11-30 NOTE — TELEPHONE ENCOUNTER
amLODIPine (NORVASC) 5 MG tablet  Last Written Prescription Date:  11/7/19  Last Fill Quantity: 90,  # refills: 3    hydrochlorothiazide (HYDRODIURIL) 25 MG tablet  Last Written Prescription Date:  11/7/19  Last Fill Quantity: 90,  # refills: 3     Last office visit: 11/7/19  Follow up due: May 2020.    90 day refill loaded for review/signature.  Note to pharmacy:  Appointment needed for refills.    Faith Mckenna RN BSN  [facility]  [phone]

## 2020-12-01 ENCOUNTER — TELEPHONE (OUTPATIENT)
Dept: OTHER | Facility: CLINIC | Age: 60
End: 2020-12-01

## 2020-12-01 DIAGNOSIS — I10 BENIGN ESSENTIAL HYPERTENSION: ICD-10-CM

## 2020-12-01 DIAGNOSIS — E78.5 HYPERLIPIDEMIA LDL GOAL <70: Primary | ICD-10-CM

## 2020-12-01 NOTE — TELEPHONE ENCOUNTER
Patient called, would like to schedule a follow up with Dr. Romero.  He was last seen November 2019.  At that visit, Dr. Romero recommended a CMP and Fasting lipids be done at his primary.    He has not had those labs drawn since November 2019.  Stanley agreed that he would have fasting labs drawn and see Dr. Romero in follow up.    Labs loaded for review/signature by Dr. Romero.

## 2021-01-20 ENCOUNTER — OFFICE VISIT (OUTPATIENT)
Dept: OTHER | Facility: CLINIC | Age: 61
End: 2021-01-20
Attending: INTERNAL MEDICINE
Payer: COMMERCIAL

## 2021-01-20 ENCOUNTER — TELEPHONE (OUTPATIENT)
Dept: OTHER | Facility: CLINIC | Age: 61
End: 2021-01-20

## 2021-01-20 VITALS
HEIGHT: 68 IN | BODY MASS INDEX: 27.74 KG/M2 | WEIGHT: 183 LBS | HEART RATE: 78 BPM | OXYGEN SATURATION: 97 % | DIASTOLIC BLOOD PRESSURE: 64 MMHG | RESPIRATION RATE: 16 BRPM | SYSTOLIC BLOOD PRESSURE: 101 MMHG

## 2021-01-20 DIAGNOSIS — E78.5 HYPERLIPIDEMIA LDL GOAL <70: ICD-10-CM

## 2021-01-20 DIAGNOSIS — I77.70 ARTERIAL DISSECTION (H): Primary | ICD-10-CM

## 2021-01-20 DIAGNOSIS — I10 BENIGN ESSENTIAL HYPERTENSION: ICD-10-CM

## 2021-01-20 PROCEDURE — 99214 OFFICE O/P EST MOD 30 MIN: CPT | Performed by: INTERNAL MEDICINE

## 2021-01-20 PROCEDURE — G0463 HOSPITAL OUTPT CLINIC VISIT: HCPCS

## 2021-01-20 RX ORDER — AMLODIPINE BESYLATE 5 MG/1
5 TABLET ORAL DAILY
Qty: 90 TABLET | Refills: 3 | Status: SHIPPED | OUTPATIENT
Start: 2021-01-20 | End: 2022-04-20

## 2021-01-20 RX ORDER — LABETALOL 200 MG/1
200 TABLET, FILM COATED ORAL EVERY 12 HOURS
Qty: 180 TABLET | Refills: 3 | Status: SHIPPED | OUTPATIENT
Start: 2021-01-20 | End: 2022-04-20

## 2021-01-20 RX ORDER — HYDROCODONE BITARTRATE AND ACETAMINOPHEN 5; 325 MG/1; MG/1
1-2 TABLET ORAL EVERY 4 HOURS PRN
Qty: 30 TABLET | Refills: 0 | Status: SHIPPED | OUTPATIENT
Start: 2021-01-20 | End: 2021-08-11

## 2021-01-20 RX ORDER — ROSUVASTATIN CALCIUM 20 MG/1
20 TABLET, COATED ORAL DAILY
Qty: 90 TABLET | Refills: 3 | Status: SHIPPED | OUTPATIENT
Start: 2021-01-20 | End: 2022-02-08

## 2021-01-20 ASSESSMENT — MIFFLIN-ST. JEOR: SCORE: 1609.58

## 2021-01-20 NOTE — PATIENT INSTRUCTIONS
1. Stop atorvastatin and instead take Crestor 20 mg daily at night time for cholesterol    2. Stop hydrochlorothiazide     3. OK  to take Vitamin D 1000 units a day and krill oil as before.    4. See me in 6 months and before visit go for Fasting lipids, CMP, Vitamin D level     5. Continue rest of the medications same.

## 2021-01-20 NOTE — PROGRESS NOTES
"Stanley aGrcia is a 60 year old male who presents for:  Chief Complaint   Patient presents with     RECHECK     Yearly exam.  Dissectionof mesenteric artery 2015 f/u.  Labs 12/1/2020 . *vg        Vitals:    Vitals:    01/20/21 1522 01/20/21 1523   BP: 121/71 101/64   BP Location: Right arm Left arm   Patient Position: Chair Chair   Cuff Size: Adult Regular Adult Regular   Pulse: 78    Resp: 16    SpO2: 97%    Weight: 183 lb (83 kg)    Height: 5' 8\" (1.727 m)        BMI:  Estimated body mass index is 27.83 kg/m  as calculated from the following:    Height as of this encounter: 5' 8\" (1.727 m).    Weight as of this encounter: 183 lb (83 kg).    Pain Score:  Data Unavailable        Lashon Odonnell CMA    "

## 2021-01-20 NOTE — PROGRESS NOTES
CC:     Follow up visit, doing well. rarely using narco for pain. CTA in June 2017 healed dissection.  No abdominal pain.tolerating meds and well controlled BP and recent lipid panel outside elevated triglycerides  Few months ago he was injured underwent hamstring repair since then doing well  Med refills    HPI: Stalney Garcia is a 59  year old year old patient who receives primary care from Erica Collado here today for the follow-up visit and he was initially  admitted to the [facility] on September 10, 2015-September 16, 2015 for extensive isolated acute SMA dissection without malperfusion.  It was thought spontaneous and his workup was unremarkable and negative.    Later he  Gave a history of injury  one week before dissection( blunt injury to the abdomen) and he ran into edge of  heavy duty board during the Labor Day weekend ( one week before admission)     After the discharge from the hospital he is doing well, blood pressure is well controlled with multiple medications.  He is tolerating statin.  He stopped warfarin last year  and currently on aspirin 162 mg daily  He denies any chest pain, shortness of breath or palpitations.  No abdominal pain, no altered bowel movements.  No hematemesis or melena.    Previous  CTA 6/2017 completely healed dissection of SMA.  Reviewed CTA and recent outside labs in the [facility].    PAST MEDICAL HISTORY  Past Medical History:   Diagnosis Date     Anxiety      HTN (hypertension)      Superior mesenteric artery thrombosis (H)     acute extensive isolated SMA disection without malperfusion 9/2015 admitted to [facility]       CURRENT MEDICATIONS  Current Outpatient Medications   Medication Sig Dispense Refill     amLODIPine (NORVASC) 5 MG tablet Take 1 tablet (5 mg) by mouth daily - Appointment needed for refills. 90 tablet 3     aspirin EC 81 MG tablet Take 162 mg by mouth       HYDROcodone-acetaminophen (NORCO) 5-325 MG tablet Take 1-2 tablets by mouth every 4 hours as needed for moderate to  severe pain 30 tablet 0     labetalol (NORMODYNE) 200 MG tablet Take 1 tablet (200 mg) by mouth every 12 hours 180 tablet 3     multivitamin, therapeutic (THERA-VIT) TABS Take 1 tablet by mouth daily       Omega-3 Fatty Acids (FISH OIL PO)        order for DME Equipment being ordered: Blood pressure cuff 1 Device 0     ramipril (ALTACE) 10 MG capsule Take 1 capsule (10 mg) by mouth At Bedtime 90 capsule 3     rosuvastatin (CRESTOR) 20 MG tablet Take 1 tablet (20 mg) by mouth daily 90 tablet 3       PAST SURGICAL HISTORY:  Past Surgical History:   Procedure Laterality Date     ORTHOPEDIC SURGERY      left ankle       ALLERGIES   No Known Allergies    FAMILY HISTORY  Family History   Problem Relation Age of Onset     Cerebrovascular Disease Mother      Other Cancer Father         esophageal       VASCULAR FAMILY HISTORY  1st order relative with atherosclerotic PAD: No  1st order relative with AAA: No    SOCIAL HISTORY  Social History     Socioeconomic History     Marital status:      Spouse name: Kerry     Number of children: 3     Years of education: Not on file     Highest education level: Not on file   Occupational History     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      Comment: [employer]   Social Needs     Financial resource strain: Not on file     Food insecurity     Worry: Not on file     Inability: Not on file     Transportation needs     Medical: Not on file     Non-medical: Not on file   Tobacco Use     Smoking status: Never Smoker     Smokeless tobacco: Former User     Tobacco comment: quit at age 29 ( chewed tobacco for 10 years??)   Substance and Sexual Activity     Alcohol use: Yes     Alcohol/week: 0.0 standard drinks     Comment: occ beer      Drug use: No     Sexual activity: Yes     Partners: Female   Lifestyle     Physical activity     Days per week: Not on file     Minutes per session: Not on file     Stress: Not on file   Relationships     Social connections     Talks on phone: Not on file     Gets  "together: Not on file     Attends Catholic service: Not on file     Active member of club or organization: Not on file     Attends meetings of clubs or organizations: Not on file     Relationship status: Not on file     Intimate partner violence     Fear of current or ex partner: Not on file     Emotionally abused: Not on file     Physically abused: Not on file     Forced sexual activity: Not on file   Other Topics Concern     Not on file   Social History Narrative     Not on file       ROS:   Constitutional: No fever, chills, or sweats. No weight gain/loss   ENT: No visual disturbance, ear ache, epistaxis, sore throat  Allergies/Immunologic: Negative  Respiratory: No cough, hemoptysia  Cardiovascular: As per HPI  GI: No nausea, vomiting, hematemesis, melena, or hematochezia  : No urinary frequency, dysuria, or hematuria  Integument: Negative  Psychiatric: Negative  Neuro: Negative  Endocrinology: Negative   Musculoskeletal: Negative  Vascular: No walking impairment, claudication, ischemic rest pain or nonhealing wounds    EXAM:  /64 (BP Location: Left arm, Patient Position: Chair, Cuff Size: Adult Regular)   Pulse 78   Resp 16   Ht 5' 8\" (1.727 m)   Wt 183 lb (83 kg)   SpO2 97%   BMI 27.83 kg/m    In general, the patient is a pleasant male in no apparent distress.    HEENT: NC/AT.  PERRLA.  EOMI.  Sclerae white, not injected.  Nares clear.  Pharynx without erythema or exudate.  Dentition intact.    Neck: No adenopathy.  No thyromegaly. Carotids +2/2 bilaterally without bruits.  No jugular venous distension.   Heart: RRR. Normal S1, S2 splits physiologically. No murmur, rub, click, or gallop. The PMI is in the 5th ICS in the midclavicular line. There is no heave.    Lungs: CTA.  No ronchi, wheezes, rales.  No dullness to percussion.   Abdomen: Soft, nontender, nondistended. No organomegaly. No AAA.  No bruits.   Extremities: No clubbing, cyanosis, or edema.  No wounds. No varicose veins signs of " chronic venous insufficiency.   Vascular: No bruits are noted.   Brachial Radial Ulnar Femoral Popliteal DP PT   Left 2/2 2/2 2/2 2/2 2/2 2/2 2/2   Right 2/2 2/2 2/2 2/2 2/2 2/2 2/2     Labs:  LIPID RESULTS:  Lab Results   Component Value Date    CHOL 145 10/21/2016    HDL 39 (L) 10/21/2016    LDL 83 10/21/2016    TRIG 115 10/21/2016    CHOLHDLRATIO 3.6 10/20/2015       LIVER ENZYME RESULTS:  Lab Results   Component Value Date    AST 20 06/14/2017    ALT 46 06/14/2017       CBC RESULTS:  Lab Results   Component Value Date    WBC 8.8 01/08/2016    RBC 4.99 01/08/2016    HGB 15.9 01/08/2016    HCT 43.7 01/08/2016    MCV 88 01/08/2016    MCH 31.9 01/08/2016    MCHC 36.4 01/08/2016    RDW 12.5 01/08/2016     01/08/2016       BMP RESULTS:  Lab Results   Component Value Date     06/14/2017    POTASSIUM 4.0 06/14/2017    CHLORIDE 106 06/14/2017    CO2 30 06/14/2017    ANIONGAP 4 06/14/2017     (H) 06/14/2017    BUN 17 06/14/2017    CR 0.84 06/14/2017    GFRESTIMATED >90  Non  GFR Calc   06/14/2017    GFRESTBLACK >90   GFR Calc   06/14/2017    ALONDRA 8.8 06/14/2017        A1C RESULTS:  Lab Results   Component Value Date    A1C 5.3 09/11/2015       Procedures:  CTA ANGIOGRAM ABDOMEN 10/13/2015 12:13 PM   HISTORY: Follow up superior mesenteric artery dissection.  COMPARISON: 9/14/2015.  TECHNIQUE: IV contrast CT angiography is performed through the abdomen  and pelvis utilizing 80 mL of Isovue-370. 3D reconstructions were  performed at an independent workstation.  FINDINGS: Again noted is a dissection of the proximal and mid  superior mesenteric artery beginning just beyond its origin and  extending to involve the origins of the first several jejunal  branches. Since the previous study there has been enlargement of the  false lumen with increased pooling of contrast via fenestrations into  the false lumen approximately 2 cm from the vessel origin. The  contrast collection  pooling in this region now measures approximately  25 x 10 mm compared to 13 x 7 mm previously. As before, this is  associated with diffuse narrowing of the true lumen in this region,  although the vessel remains patent with filling of the distal jejunal  branches. As before, the remaining false lumen is unopacified and  presumably is thrombosed. The celiac trunk, the inferior mesenteric  artery, and the renal arteries remain patent and unchanged. The  abdominal aorta and the iliac vessels are normal and unchanged. There  is no evidence of mural edema to suggest bowel ischemia, although the  accuracy of this is diminished due to the lack of oral contrast.  Remainder of the abdomen and pelvis is unchanged.  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 There  is persistent but stable narrowing of the true lumen of the proximal  and mid superior mesenteric artery which does not appear to be  progressively compromised despite the enlarging false lumen. The  remaining visceral vessels as well as the aortoiliac vessels remain  normal and unchanged. No evidence of mural edema to suggest bowel  ischemia on this nonoral contrast scan.  RENZO BURCIAGA MD        CTA ANGIOGRAM ABDOMEN AND PELVIS  5/19/2016 1:58 PM       HISTORY:  Follow up superior mesenteric artery dissection.     COMPARISON:  1/8/2016.     TECHNIQUE: IV contrast CT angiography is performed through the abdomen  and pelvis utilizing 80 mL of Isovue-370. 3D reconstructions were  performed at an independent workstation.     FINDINGS: Again noted is a localized dissection of the superior  mesenteric artery beginning approximately 2 cm from its origin. Since  the previous study there has been almost complete thrombosis of the  false lumen with improvement in the degree of extrinsic compression of  the true lumen which is now almost completely re-expanded to  normal  caliber. There is now only minimal opacification of the false lumen  with contrast. There is no evidence of expansion of the dissection.     The celiac trunk, the inferior mesenteric artery, and the renal  arteries remain widely patent. The abdominal aorta and the common  iliac arteries normal.     There are stable multiple hepatic and right lower pole renal cysts.  There is a small stable nodule involving the medial aspect of the  right adrenal gland. There also appears to be mild stable thickening  or small nodule in the medial aspect of the left adrenal gland. These  are unchanged. Remainder of the abdomen and pelvis is negative except  for hypertrophic degenerative changes in the lumbar spine.                                                                       IMPRESSION:    1. Interval partial thrombosis of the false lumen of the previous  superior mesenteric artery localized dissection with resultant  improvement in the luminal diameter of the true lumen with reduction  in extrinsic compression of the true lumen.  2. The remaining visceral vessels as well as the abdominal aorta and  visualized iliac arteries are normal.  3. Stable hepatic and right renal cyst.  4. Stable thickening or small nodule in the right adrenal gland and  stable mild thickening in the left adrenal gland.       RENZO BURCIAGA MD        CTA ANGIOGRAM ABDOMEN AND PELVIS  6/14/2017  1:16 PM     HISTORY: 57-year-old patient with history of superior mesenteric  arterial dissection.     COMPARISON: May 19, 2016.     TECHNIQUE: Multiplanar and multi formatted CTA images were obtained  from the lung bases through the abdomen and pelvis after the  uneventful administration of Isovue-370 intravenous contrast given for  a total of 80 mL. Radiation dose for this scan was reduced using  automated exposure control, adjustment of the mA and/or kV according  to patient size, or iterative reconstruction technique. 3-D  reformatted images were  created at a separate workstation.     FINDINGS: The visible lung bases are clear. Heart size is normal. No  pleural effusion or pericardial effusion. Multiple hepatic cysts are  identified. The largest is in the left hepatic lobe measuring 4  Hounsfield units and 3.7 x 4.2 cm, relatively unchanged from a  previous exam. The gallbladder, spleen, adrenal glands, and pancreas  are unremarkable. Both kidneys are normally perfused and without  hydronephrosis. Right renal hypodensities again identified and  unchanged. No intraperitoneal fluid or air. Bladder is mostly  decompressed and unremarkable. Sigmoid colon diverticulosis, without  evidence of diverticulitis. Appendix is normal in size and appearance  in the right lower quadrant. No acute osseous abnormality.     The abdominal aorta is normal in size and appearance. The celiac axis,  SMA, bilateral renal, the inferior mesenteric arteries are patent.  Both common iliac, internal iliac, external iliac, common femoral  arteries are patent. Small focal dissection is again visible in the  superior mesenteric artery. Widest diameter is up to 9 mm AP,  unchanged from previous exam. The appearance of a possible dissection  is best identified on axial CT imaging, though difficult to visualize  on sagittal imaging. In comparison to previous CT exam on January 8, 2016, suspect this is an essentially healed dissection without  residual stenosis.         IMPRESSION:  1. Patent visceral arteries. Specifically, previously identified  dissection in the superior mesenteric artery appears to have  essentially completely healed. Minimal residual irregularity, though  not flow-limiting and unchanged from most recent CT exam on May 19,  2016. No new or additional dissections.  2. Unchanged hepatic and right renal cysts.     EMIGDIO WISEMAN MD    CTA ANGIOGRAM ABDOMEN AND PELVIS  6/14/2017  1:16 PM     HISTORY: 57-year-old patient with history of superior mesenteric  arterial  dissection.     COMPARISON: May 19, 2016.     TECHNIQUE: Multiplanar and multi formatted CTA images were obtained  from the lung bases through the abdomen and pelvis after the  uneventful administration of Isovue-370 intravenous contrast given for  a total of 80 mL. Radiation dose for this scan was reduced using  automated exposure control, adjustment of the mA and/or kV according  to patient size, or iterative reconstruction technique. 3-D  reformatted images were created at a separate workstation.     FINDINGS: The visible lung bases are clear. Heart size is normal. No  pleural effusion or pericardial effusion. Multiple hepatic cysts are  identified. The largest is in the left hepatic lobe measuring 4  Hounsfield units and 3.7 x 4.2 cm, relatively unchanged from a  previous exam. The gallbladder, spleen, adrenal glands, and pancreas  are unremarkable. Both kidneys are normally perfused and without  hydronephrosis. Right renal hypodensities again identified and  unchanged. No intraperitoneal fluid or air. Bladder is mostly  decompressed and unremarkable. Sigmoid colon diverticulosis, without  evidence of diverticulitis. Appendix is normal in size and appearance  in the right lower quadrant. No acute osseous abnormality.     The abdominal aorta is normal in size and appearance. The celiac axis,  SMA, bilateral renal, the inferior mesenteric arteries are patent.  Both common iliac, internal iliac, external iliac, common femoral  arteries are patent. Small focal dissection is again visible in the  superior mesenteric artery. Widest diameter is up to 9 mm AP,  unchanged from previous exam. The appearance of a possible dissection  is best identified on axial CT imaging, though difficult to visualize  on sagittal imaging. In comparison to previous CT exam on January 8, 2016, suspect this is an essentially healed dissection without  residual stenosis.         IMPRESSION:  1. Patent visceral arteries. Specifically,  previously identified  dissection in the superior mesenteric artery appears to have  essentially completely healed. Minimal residual irregularity, though  not flow-limiting and unchanged from most recent CT exam on May 19,  2016. No new or additional dissections.  2. Unchanged hepatic and right renal cysts.     EMIGDIO WISEMAN MD    Assessment and Plan:     1.  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    Doing well with current meds    Currently on multiple BP meds with excellent control  Recently elevated triglycerides and he is taking hydrochlorothiazide 25 mg daily  Will discontinue HCTZ and monitor blood pressure outside  He denies any CP, Abdominal pain, no weight loss or postprandial issues.    He has no family history of connective tissue disorders and his clinical exam did not fit with Marfan's, EDS or LDS. In addition, his rheumatoid factor, TRINA, and vasculitis panel were all negative.  He is currently  on multiple antihypertensives, including amlodipine, labetalol, hydrochlorothiazide, and ramipril. He was  initiated Lipitor 40 mg daily for pleiotropic benefit.No abdominal pain, hematemesis or melena.  His blood pressure is well controlled.     He underwent repeat CTA in 6/2017 As delineated above, completely healed dissection.    Reviewed recent lab results in care everywhere , all of them are good.     Plan:  Discontinue hydrochlorothiazide  Switch atorvastatin to Crestor 20 mg daily new prescription sent  Continue  aspirin 162 mg daily with food.  Maintain good BP  Will plan for CTA if clinical status changes.  Repeat fasting lipids and other labs in 6 months then follow-up with me    2. Hypertension    His blood pressure is well controlled      Tolerating medications without any problem.  Stopped hydrochlorothiazide due to elevated triglycerides  Continue rest of the blood pressure medications same  Monitor blood pressure and goal is less than 130/80    3. Hyperlipidemia:  Doing well with statin , Lipids well controlled , he is having aches and pains and also he read about side effects of high-dose cholesterol medication wondering can be reduced or change to different medicine  Stop atorvastatin instead give the Crestor 20 mg daily and watch for side effects if needed we can reduce the dose      Total patient care time spent today 30 minutes  Written instructions with AVS given return to clinic in 6 months    This note was dictated by utilizing dragon software  Cc: Primary care physician   RTC 6 months

## 2021-01-20 NOTE — CONFIDENTIAL NOTE
Pharmacy requesting clarification of norco prescription.    On medication- has been a while since he has taken a Narcotic.  Would recommend morphine equivalent at 15 or under.  Limit of 6 tablets in one day requested.    Verbal order given per Dr. Romero.    Faith Mckenna RN BSN  [facility]  [phone]

## 2021-01-20 NOTE — TELEPHONE ENCOUNTER
[facility]    Who is the name of the provider?:  Nick      What is the location you see this provider at?: [place]    Reason for call:  Needs clarification of order for Norco    Can we leave a detailed message on this number?  YES

## 2021-02-22 DIAGNOSIS — I10 BENIGN ESSENTIAL HYPERTENSION: ICD-10-CM

## 2021-02-22 RX ORDER — RAMIPRIL 10 MG/1
CAPSULE ORAL
Qty: 90 CAPSULE | Refills: 0 | Status: SHIPPED | OUTPATIENT
Start: 2021-02-22 | End: 2021-06-28

## 2021-02-22 NOTE — TELEPHONE ENCOUNTER
ramipril (ALTACE) 10 MG capsule  Last Written Prescription Date:  11/7/19  Last Fill Quantity: 90,  # refills: 3     Last office visit: 1/20/2021   Follow up due:  July 2021    Unable to fill per [facility] protocol.  Medication and pharmacy loaded.      ACE Inhibitors (Including Combos) Protocol [id number]/22/2021 03:54 PM   Normal serum creatinine on file in past 12 months Protocol Details    Normal serum potassium on file in past 12 months        Faith Mckenna RN BSN  [facility]  [phone]

## 2021-06-26 DIAGNOSIS — I10 BENIGN ESSENTIAL HYPERTENSION: ICD-10-CM

## 2021-06-28 RX ORDER — RAMIPRIL 10 MG/1
CAPSULE ORAL
Qty: 90 CAPSULE | Refills: 0 | Status: SHIPPED | OUTPATIENT
Start: 2021-06-28 | End: 2021-08-11

## 2021-06-28 NOTE — TELEPHONE ENCOUNTER
Unable to refill per [facility] protocol.    Lor GOODSONN, RN    [facility]  [facility]  Office: [phone]  Fax: [phone]

## 2021-08-10 ENCOUNTER — LAB (OUTPATIENT)
Dept: LAB | Facility: CLINIC | Age: 61
End: 2021-08-10
Payer: COMMERCIAL

## 2021-08-10 DIAGNOSIS — I10 BENIGN ESSENTIAL HYPERTENSION: ICD-10-CM

## 2021-08-10 DIAGNOSIS — E78.5 HYPERLIPIDEMIA LDL GOAL <70: ICD-10-CM

## 2021-08-10 LAB
ALBUMIN SERPL-MCNC: 3.6 G/DL (ref 3.4–5)
ALP SERPL-CCNC: 60 U/L (ref 40–150)
ALT SERPL W P-5'-P-CCNC: 35 U/L (ref 0–70)
ANION GAP SERPL CALCULATED.3IONS-SCNC: 1 MMOL/L (ref 3–14)
AST SERPL W P-5'-P-CCNC: 15 U/L (ref 0–45)
BILIRUB SERPL-MCNC: 0.4 MG/DL (ref 0.2–1.3)
BUN SERPL-MCNC: 15 MG/DL (ref 7–30)
CALCIUM SERPL-MCNC: 9.3 MG/DL (ref 8.5–10.1)
CHLORIDE BLD-SCNC: 106 MMOL/L (ref 94–109)
CHOLEST SERPL-MCNC: 134 MG/DL
CO2 SERPL-SCNC: 32 MMOL/L (ref 20–32)
CREAT SERPL-MCNC: 0.9 MG/DL (ref 0.66–1.25)
FASTING STATUS PATIENT QL REPORTED: NORMAL
GFR SERPL CREATININE-BSD FRML MDRD: >90 ML/MIN/1.73M2
GLUCOSE BLD-MCNC: 113 MG/DL (ref 70–99)
HDLC SERPL-MCNC: 42 MG/DL
LDLC SERPL CALC-MCNC: 76 MG/DL
NONHDLC SERPL-MCNC: 92 MG/DL
POTASSIUM BLD-SCNC: 4.3 MMOL/L (ref 3.4–5.3)
PROT SERPL-MCNC: 6.7 G/DL (ref 6.8–8.8)
SODIUM SERPL-SCNC: 139 MMOL/L (ref 133–144)
TRIGL SERPL-MCNC: 80 MG/DL

## 2021-08-10 PROCEDURE — 80053 COMPREHEN METABOLIC PANEL: CPT

## 2021-08-10 PROCEDURE — 80061 LIPID PANEL: CPT

## 2021-08-10 PROCEDURE — 36415 COLL VENOUS BLD VENIPUNCTURE: CPT

## 2021-08-11 ENCOUNTER — OFFICE VISIT (OUTPATIENT)
Dept: OTHER | Facility: CLINIC | Age: 61
End: 2021-08-11
Attending: INTERNAL MEDICINE
Payer: COMMERCIAL

## 2021-08-11 VITALS
SYSTOLIC BLOOD PRESSURE: 133 MMHG | RESPIRATION RATE: 16 BRPM | HEART RATE: 80 BPM | WEIGHT: 179 LBS | OXYGEN SATURATION: 96 % | DIASTOLIC BLOOD PRESSURE: 88 MMHG | HEIGHT: 68 IN | BODY MASS INDEX: 27.13 KG/M2

## 2021-08-11 DIAGNOSIS — I10 BENIGN ESSENTIAL HYPERTENSION: ICD-10-CM

## 2021-08-11 DIAGNOSIS — I77.70 ARTERIAL DISSECTION (H): Primary | ICD-10-CM

## 2021-08-11 DIAGNOSIS — E78.5 HYPERLIPIDEMIA LDL GOAL <70: ICD-10-CM

## 2021-08-11 PROCEDURE — 99213 OFFICE O/P EST LOW 20 MIN: CPT | Performed by: INTERNAL MEDICINE

## 2021-08-11 PROCEDURE — G0463 HOSPITAL OUTPT CLINIC VISIT: HCPCS

## 2021-08-11 RX ORDER — RAMIPRIL 5 MG/1
5 CAPSULE ORAL DAILY
Qty: 90 CAPSULE | Refills: 3 | Status: SHIPPED | OUTPATIENT
Start: 2021-08-11 | End: 2021-09-07

## 2021-08-11 RX ORDER — HYDROCODONE BITARTRATE AND ACETAMINOPHEN 5; 325 MG/1; MG/1
1-2 TABLET ORAL EVERY 6 HOURS PRN
Qty: 30 TABLET | Refills: 0 | Status: SHIPPED | OUTPATIENT
Start: 2021-08-11 | End: 2022-07-12

## 2021-08-11 ASSESSMENT — MIFFLIN-ST. JEOR: SCORE: 1586.44

## 2021-08-11 NOTE — PROGRESS NOTES
"[facility]        Patient is here for a follow up  to discuss about lab results    Patient's condition is stable.    /88 (BP Location: Left arm, Patient Position: Chair, Cuff Size: Adult Regular)   Pulse 80   Resp 16   Ht 5' 8\" (1.727 m)   Wt 179 lb (81.2 kg)   SpO2 96%   BMI 27.22 kg/m      The provider has been notified that the patient has no concerns.     Questions patient would like addressed today are: N/A.    Refills are needed: No    Has homecare services and agency name:  Jenn Odonnell CMA      "

## 2021-08-11 NOTE — PATIENT INSTRUCTIONS
Please get fasting lipids and other labs in 6 months then virtual or office visit    Continue current medications.

## 2021-08-11 NOTE — PROGRESS NOTES
CC:     Follow up visit, doing well. rarely using narco for pain. CTA in June 2017 healed dissection.  No abdominal pain.tolerating meds and well controlled BP and recent lipid panel outside elevated triglycerides  Few months ago he was injured underwent hamstring repair since then doing well  Med refills    HPI: Stanley Garcia is a 61  year old year old patient who receives primary care from Erica Collado here today for the follow-up visit and he was initially  admitted to the [facility] on September 10, 2015-September 16, 2015 for extensive isolated acute SMA dissection without malperfusion.  It was thought spontaneous and his workup was unremarkable and negative.    Later he  gave a history of injury  one week before dissection( blunt injury to the abdomen) and he ran into edge of  heavy duty board during the Labor Day weekend ( one week before admission)     After the discharge from the hospital he is doing well, blood pressure is well controlled with multiple medications.  He is tolerating statin.  He stopped warfarin last year  and currently on aspirin 162 mg daily  He denies any chest pain, shortness of breath or palpitations.  No abdominal pain, no altered bowel movements.  No hematemesis or melena.    Previous  CTA 6/2017 completely healed dissection of SMA.  Reviewed CTA and recent outside labs in the epic.    Reviewed recent labs.    PAST MEDICAL HISTORY  Past Medical History:   Diagnosis Date     Anxiety      HTN (hypertension)      Superior mesenteric artery thrombosis (H)     acute extensive isolated SMA disection without malperfusion 9/2015 admitted to [facility]       CURRENT MEDICATIONS  Current Outpatient Medications   Medication Sig Dispense Refill     amLODIPine (NORVASC) 5 MG tablet Take 1 tablet (5 mg) by mouth daily - Appointment needed for refills. 90 tablet 3     aspirin EC 81 MG tablet Take 162 mg by mouth       HYDROcodone-acetaminophen (NORCO) 5-325 MG tablet Take 1-2 tablets by mouth every 6 hours as  needed for moderate to severe pain 30 tablet 0     labetalol (NORMODYNE) 200 MG tablet Take 1 tablet (200 mg) by mouth every 12 hours 180 tablet 3     multivitamin, therapeutic (THERA-VIT) TABS Take 1 tablet by mouth daily       Omega-3 Fatty Acids (FISH OIL PO)        order for DME Equipment being ordered: Blood pressure cuff 1 Device 0     ramipril (ALTACE) 5 MG capsule Take 1 capsule (5 mg) by mouth daily 90 capsule 3     rosuvastatin (CRESTOR) 20 MG tablet Take 1 tablet (20 mg) by mouth daily 90 tablet 3       PAST SURGICAL HISTORY:  Past Surgical History:   Procedure Laterality Date     ORTHOPEDIC SURGERY      left ankle       ALLERGIES   No Known Allergies    FAMILY HISTORY  Family History   Problem Relation Age of Onset     Cerebrovascular Disease Mother      Other Cancer Father         esophageal       VASCULAR FAMILY HISTORY  1st order relative with atherosclerotic PAD: No  1st order relative with AAA: No    SOCIAL HISTORY  Social History     Socioeconomic History     Marital status:      Spouse name: Kerry     Number of children: 3     Years of education: Not on file     Highest education level: Not on file   Occupational History     Occupation: fork lift      Comment: [employer]   Tobacco Use     Smoking status: Never Smoker     Smokeless tobacco: Former User     Tobacco comment: quit at age 29 ( chewed tobacco for 10 years??)   Substance and Sexual Activity     Alcohol use: Yes     Alcohol/week: 0.0 standard drinks     Comment: occ beer      Drug use: No     Sexual activity: Yes     Partners: Female   Other Topics Concern     Not on file   Social History Narrative     Not on file     Social Determinants of Health     Financial Resource Strain:      Difficulty of Paying Living Expenses:    Food Insecurity:      Worried About Running Out of Food in the Last Year:      Ran Out of Food in the Last Year:    Transportation Needs:      Lack of Transportation (Medical):      Lack of Transportation  "(Non-Medical):    Physical Activity:      Days of Exercise per Week:      Minutes of Exercise per Session:    Stress:      Feeling of Stress :    Social Connections:      Frequency of Communication with Friends and Family:      Frequency of Social Gatherings with Friends and Family:      Attends L[employer] Services:      Active Member of Clubs or Organizations:      Attends Club or Organization Meetings:      Marital Status:    Intimate Partner Violence:      Fear of Current or Ex-Partner:      Emotionally Abused:      Physically Abused:      Sexually Abused:        ROS:   Constitutional: No fever, chills, or sweats. No weight gain/loss   ENT: No visual disturbance, ear ache, epistaxis, sore throat  Allergies/Immunologic: Negative  Respiratory: No cough, hemoptysia  Cardiovascular: As per HPI  GI: No nausea, vomiting, hematemesis, melena, or hematochezia  : No urinary frequency, dysuria, or hematuria  Integument: Negative  Psychiatric: Negative  Neuro: Negative  Endocrinology: Negative   Musculoskeletal: Negative  Vascular: No walking impairment, claudication, ischemic rest pain or nonhealing wounds    EXAM:  /88 (BP Location: Left arm, Patient Position: Chair, Cuff Size: Adult Regular)   Pulse 80   Resp 16   Ht 5' 8\" (1.727 m)   Wt 179 lb (81.2 kg)   SpO2 96%   BMI 27.22 kg/m    In general, the patient is a pleasant male in no apparent distress.    HEENT: NC/AT.  PERRLA.  EOMI.  Sclerae white, not injected.  Nares clear.  Pharynx without erythema or exudate.  Dentition intact.    Neck: No adenopathy.  No thyromegaly. Carotids +2/2 bilaterally without bruits.  No jugular venous distension.   Heart: RRR. Normal S1, S2 splits physiologically. No murmur, rub, click, or gallop. The PMI is in the 5th ICS in the midclavicular line. There is no heave.    Lungs: CTA.  No ronchi, wheezes, rales.  No dullness to percussion.   Abdomen: Soft, nontender, nondistended. No organomegaly. No AAA.  No bruits. "   Extremities: No clubbing, cyanosis, or edema.  No wounds. No varicose veins signs of chronic venous insufficiency.   Vascular: No bruits are noted.   Brachial Radial Ulnar Femoral Popliteal DP PT   Left 2/2 2/2 2/2 2/2 2/2 2/2 2/2   Right 2/2 2/2 2/2 2/2 2/2 2/2 2/2     Labs:  LIPID RESULTS:  Lab Results   Component Value Date    CHOL 134 08/10/2021    CHOL 145 10/21/2016    HDL 42 08/10/2021    HDL 39 (L) 10/21/2016    LDL 76 08/10/2021    LDL 83 10/21/2016    TRIG 80 08/10/2021    TRIG 115 10/21/2016    CHOLHDLRATIO 3.6 10/20/2015       LIVER ENZYME RESULTS:  Lab Results   Component Value Date    AST 15 08/10/2021    AST 20 06/14/2017    ALT 35 08/10/2021    ALT 46 06/14/2017       CBC RESULTS:  Lab Results   Component Value Date    WBC 8.8 01/08/2016    RBC 4.99 01/08/2016    HGB 15.9 01/08/2016    HCT 43.7 01/08/2016    MCV 88 01/08/2016    MCH 31.9 01/08/2016    MCHC 36.4 01/08/2016    RDW 12.5 01/08/2016     01/08/2016       BMP RESULTS:  Lab Results   Component Value Date     08/10/2021     06/14/2017    POTASSIUM 4.3 08/10/2021    POTASSIUM 4.0 06/14/2017    CHLORIDE 106 08/10/2021    CHLORIDE 106 06/14/2017    CO2 32 08/10/2021    CO2 30 06/14/2017    ANIONGAP 1 (L) 08/10/2021    ANIONGAP 4 06/14/2017     (H) 08/10/2021     (H) 06/14/2017    BUN 15 08/10/2021    BUN 17 06/14/2017    CR 0.90 08/10/2021    CR 0.84 06/14/2017    GFRESTIMATED >90 08/10/2021    GFRESTIMATED >90  Non  GFR Calc   06/14/2017    GFRESTBLACK >90   GFR Calc   06/14/2017    ALONDRA 9.3 08/10/2021    ALONDRA 8.8 06/14/2017        A1C RESULTS:  Lab Results   Component Value Date    A1C 5.3 09/11/2015       Procedures:  CTA ANGIOGRAM ABDOMEN 10/13/2015 12:13 PM   HISTORY: Follow up superior mesenteric artery dissection.  COMPARISON: 9/14/2015.  TECHNIQUE: IV contrast CT angiography is performed through the abdomen  and pelvis utilizing 80 mL of Isovue-370. 3D reconstructions  were  performed at an independent workstation.  FINDINGS: Again noted is a dissection of the proximal and mid  superior mesenteric artery beginning just beyond its origin and  extending to involve the origins of the first several jejunal  branches. Since the previous study there has been enlargement of the  false lumen with increased pooling of contrast via fenestrations into  the false lumen approximately 2 cm from the vessel origin. The  contrast collection pooling in this region now measures approximately  25 x 10 mm compared to 13 x 7 mm previously. As before, this is  associated with diffuse narrowing of the true lumen in this region,  although the vessel remains patent with filling of the distal jejunal  branches. As before, the remaining false lumen is unopacified and  presumably is thrombosed. The celiac trunk, the inferior mesenteric  artery, and the renal arteries remain patent and unchanged. The  abdominal aorta and the iliac vessels are normal and unchanged. There  is no evidence of mural edema to suggest bowel ischemia, although the  accuracy of this is diminished due to the lack of oral contrast.  Remainder of the abdomen and pelvis is unchanged.  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 There  is persistent but stable narrowing of the true lumen of the proximal  and mid superior mesenteric artery which does not appear to be  progressively compromised despite the enlarging false lumen. The  remaining visceral vessels as well as the aortoiliac vessels remain  normal and unchanged. No evidence of mural edema to suggest bowel  ischemia on this nonoral contrast scan.  RENZO BURCIAGA MD        CTA ANGIOGRAM ABDOMEN AND PELVIS  5/19/2016 1:58 PM       HISTORY:  Follow up superior mesenteric artery dissection.     COMPARISON:  1/8/2016.     TECHNIQUE: IV contrast CT angiography is performed through the  abdomen  and pelvis utilizing 80 mL of Isovue-370. 3D reconstructions were  performed at an independent workstation.     FINDINGS: Again noted is a localized dissection of the superior  mesenteric artery beginning approximately 2 cm from its origin. Since  the previous study there has been almost complete thrombosis of the  false lumen with improvement in the degree of extrinsic compression of  the true lumen which is now almost completely re-expanded to normal  caliber. There is now only minimal opacification of the false lumen  with contrast. There is no evidence of expansion of the dissection.     The celiac trunk, the inferior mesenteric artery, and the renal  arteries remain widely patent. The abdominal aorta and the common  iliac arteries normal.     There are stable multiple hepatic and right lower pole renal cysts.  There is a small stable nodule involving the medial aspect of the  right adrenal gland. There also appears to be mild stable thickening  or small nodule in the medial aspect of the left adrenal gland. These  are unchanged. Remainder of the abdomen and pelvis is negative except  for hypertrophic degenerative changes in the lumbar spine.                                                                       IMPRESSION:    1. Interval partial thrombosis of the false lumen of the previous  superior mesenteric artery localized dissection with resultant  improvement in the luminal diameter of the true lumen with reduction  in extrinsic compression of the true lumen.  2. The remaining visceral vessels as well as the abdominal aorta and  visualized iliac arteries are normal.  3. Stable hepatic and right renal cyst.  4. Stable thickening or small nodule in the right adrenal gland and  stable mild thickening in the left adrenal gland.       RENZO BURCIAGA MD        CTA ANGIOGRAM ABDOMEN AND PELVIS  6/14/2017  1:16 PM     HISTORY: 57-year-old patient with history of superior mesenteric  arterial  dissection.     COMPARISON: May 19, 2016.     TECHNIQUE: Multiplanar and multi formatted CTA images were obtained  from the lung bases through the abdomen and pelvis after the  uneventful administration of Isovue-370 intravenous contrast given for  a total of 80 mL. Radiation dose for this scan was reduced using  automated exposure control, adjustment of the mA and/or kV according  to patient size, or iterative reconstruction technique. 3-D  reformatted images were created at a separate workstation.     FINDINGS: The visible lung bases are clear. Heart size is normal. No  pleural effusion or pericardial effusion. Multiple hepatic cysts are  identified. The largest is in the left hepatic lobe measuring 4  Hounsfield units and 3.7 x 4.2 cm, relatively unchanged from a  previous exam. The gallbladder, spleen, adrenal glands, and pancreas  are unremarkable. Both kidneys are normally perfused and without  hydronephrosis. Right renal hypodensities again identified and  unchanged. No intraperitoneal fluid or air. Bladder is mostly  decompressed and unremarkable. Sigmoid colon diverticulosis, without  evidence of diverticulitis. Appendix is normal in size and appearance  in the right lower quadrant. No acute osseous abnormality.     The abdominal aorta is normal in size and appearance. The celiac axis,  SMA, bilateral renal, the inferior mesenteric arteries are patent.  Both common iliac, internal iliac, external iliac, common femoral  arteries are patent. Small focal dissection is again visible in the  superior mesenteric artery. Widest diameter is up to 9 mm AP,  unchanged from previous exam. The appearance of a possible dissection  is best identified on axial CT imaging, though difficult to visualize  on sagittal imaging. In comparison to previous CT exam on January 8, 2016, suspect this is an essentially healed dissection without  residual stenosis.         IMPRESSION:  1. Patent visceral arteries. Specifically,  previously identified  dissection in the superior mesenteric artery appears to have  essentially completely healed. Minimal residual irregularity, though  not flow-limiting and unchanged from most recent CT exam on May 19,  2016. No new or additional dissections.  2. Unchanged hepatic and right renal cysts.     EMIGDIO WISEMAN MD    CTA ANGIOGRAM ABDOMEN AND PELVIS  6/14/2017  1:16 PM     HISTORY: 57-year-old patient with history of superior mesenteric  arterial dissection.     COMPARISON: May 19, 2016.     TECHNIQUE: Multiplanar and multi formatted CTA images were obtained  from the lung bases through the abdomen and pelvis after the  uneventful administration of Isovue-370 intravenous contrast given for  a total of 80 mL. Radiation dose for this scan was reduced using  automated exposure control, adjustment of the mA and/or kV according  to patient size, or iterative reconstruction technique. 3-D  reformatted images were created at a separate workstation.     FINDINGS: The visible lung bases are clear. Heart size is normal. No  pleural effusion or pericardial effusion. Multiple hepatic cysts are  identified. The largest is in the left hepatic lobe measuring 4  Hounsfield units and 3.7 x 4.2 cm, relatively unchanged from a  previous exam. The gallbladder, spleen, adrenal glands, and pancreas  are unremarkable. Both kidneys are normally perfused and without  hydronephrosis. Right renal hypodensities again identified and  unchanged. No intraperitoneal fluid or air. Bladder is mostly  decompressed and unremarkable. Sigmoid colon diverticulosis, without  evidence of diverticulitis. Appendix is normal in size and appearance  in the right lower quadrant. No acute osseous abnormality.     The abdominal aorta is normal in size and appearance. The celiac axis,  SMA, bilateral renal, the inferior mesenteric arteries are patent.  Both common iliac, internal iliac, external iliac, common femoral  arteries are patent. Small  focal dissection is again visible in the  superior mesenteric artery. Widest diameter is up to 9 mm AP,  unchanged from previous exam. The appearance of a possible dissection  is best identified on axial CT imaging, though difficult to visualize  on sagittal imaging. In comparison to previous CT exam on January 8, 2016, suspect this is an essentially healed dissection without  residual stenosis.         IMPRESSION:  1. Patent visceral arteries. Specifically, previously identified  dissection in the superior mesenteric artery appears to have  essentially completely healed. Minimal residual irregularity, though  not flow-limiting and unchanged from most recent CT exam on May 19,  2016. No new or additional dissections.  2. Unchanged hepatic and right renal cysts.     EMIGDIO WISEMAN MD    Assessment and Plan:     1.  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    Doing well with current meds    Currently on multiple BP meds with excellent control  Recently elevated triglycerides and he is taking hydrochlorothiazide 25 mg daily  Will discontinue HCTZ and monitor blood pressure outside  He denies any CP, Abdominal pain, no weight loss or postprandial issues.    He has no family history of connective tissue disorders and his clinical exam did not fit with Marfan's, EDS or LDS. In addition, his rheumatoid factor, TRINA, and vasculitis panel were all negative.  He is currently  on multiple antihypertensives, including amlodipine, labetalol, hydrochlorothiazide, and ramipril. He was  initiated Lipitor 40 mg daily for pleiotropic benefit.No abdominal pain, hematemesis or melena.  His blood pressure is well controlled.     He underwent repeat CTA in 6/2017 As delineated above,  completely healed dissection.    Reviewed recent lab results in care everywhere , all of them are good.     Plan:  Continue Crestor 20 mg daily   Continue  aspirin 162 mg daily with food.  Maintain good BP  Will plan for CTA if clinical status changes.  Repeat fasting lipids and other labs in 6 months then follow-up with me    2. Hypertension    His blood pressure is well controlled     Tolerating medications without any problem.  Stopped hydrochlorothiazide due to elevated triglycerides  Continue rest of the blood pressure medications same  Monitor blood pressure and goal is less than 130/80  Check CMP    3. Hyperlipidemia:  Doing well with statin , Lipids well controlled , he is having aches and pains and also he read about side effects of high-dose cholesterol medication wondering can be reduced or change to different medicine  Stop atorvastatin instead started  Crestor 20 mg daily and watch for side effects if needed we can reduce the dose  Repeat FLP in 6 months      Total patient care time spent today 21 minutes  Written instructions with AVS given return to clinic in 6 months    This note was dictated by utilizing dragon software  Cc: Primary care physician   RTC 6 months

## 2021-09-07 ENCOUNTER — TELEPHONE (OUTPATIENT)
Dept: OTHER | Facility: CLINIC | Age: 61
End: 2021-09-07

## 2021-09-07 DIAGNOSIS — I10 BENIGN ESSENTIAL HYPERTENSION: ICD-10-CM

## 2021-09-07 RX ORDER — RAMIPRIL 10 MG/1
10 CAPSULE ORAL DAILY
Qty: 90 CAPSULE | Refills: 1 | Status: SHIPPED | OUTPATIENT
Start: 2021-09-07 | End: 2022-06-24

## 2021-09-07 NOTE — TELEPHONE ENCOUNTER
Patient has been taking 10 mg of ramipril since 2015.       He just noticed the refill written by Dr. Romero on 8/11/21 was for 5 mg.  He does not recall any discussion of lowering the dose.    He states his BP has been very close to goal (120s -130s systolic).  He denies any symptoms of hypotension.    Routing to Dr. Romero to determine rationale for dose decrease from 10 mg to 5 mg.    Faith Mckenna RN BSN  [facility]  [phone]

## 2021-09-07 NOTE — TELEPHONE ENCOUNTER
Relayed to patient via voice message.  Rx sent per SCOUT.    Faith Mckenna RN BSN  [facility]  [phone]

## 2021-10-23 ENCOUNTER — HEALTH MAINTENANCE LETTER (OUTPATIENT)
Age: 61
End: 2021-10-23

## 2022-02-08 DIAGNOSIS — E78.5 HYPERLIPIDEMIA LDL GOAL <70: ICD-10-CM

## 2022-02-08 RX ORDER — ROSUVASTATIN CALCIUM 20 MG/1
TABLET, COATED ORAL
Qty: 90 TABLET | Refills: 1 | Status: SHIPPED | OUTPATIENT
Start: 2022-02-08 | End: 2022-02-21

## 2022-02-08 NOTE — TELEPHONE ENCOUNTER
"Last Written Prescription Date:  1/20/21  Last Fill Quantity: 90,  # refills: 3   Last office visit: 8/11/2021 with prescribing provider:     Future Office Visit:      Requested Prescriptions   Pending Prescriptions Disp Refills     rosuvastatin (CRESTOR) 20 MG tablet [Pharmacy Med Name: Rosuvastatin Calcium 20 MG Oral Tablet] 90 tablet 0     Sig: Take 1 tablet by mouth once daily       Statins Protocol Passed - 2/8/2022 12:33 PM        Passed - LDL on file in past 12 months     Recent Labs   Lab Test 08/10/21  0942   LDL 76             Passed - No abnormal creatine kinase in past 12 months     No lab results found.             Passed - Recent (12 mo) or future (30 days) visit within the authorizing provider's specialty     Patient has had an office visit with the authorizing provider or a provider within the authorizing providers department within the previous 12 mos or has a future within next 30 days. See \"Patient Info\" tab in inbasket, or \"Choose Columns\" in Meds & Orders section of the refill encounter.              Passed - Medication is active on med list        Passed - Patient is age 18 or older             Prescription approved per [facility] Refill Protocol.    Lor ALMANZA, RN    [facility]  Office: [phone]  Fax: [phone]        "

## 2022-02-19 DIAGNOSIS — E78.5 HYPERLIPIDEMIA LDL GOAL <70: ICD-10-CM

## 2022-02-21 DIAGNOSIS — E78.5 HYPERLIPIDEMIA LDL GOAL <70: ICD-10-CM

## 2022-02-21 RX ORDER — ROSUVASTATIN CALCIUM 20 MG/1
20 TABLET, COATED ORAL DAILY
Qty: 90 TABLET | Refills: 1 | Status: SHIPPED | OUTPATIENT
Start: 2022-02-21 | End: 2022-07-12

## 2022-02-21 RX ORDER — ROSUVASTATIN CALCIUM 20 MG/1
TABLET, COATED ORAL
Qty: 90 TABLET | Refills: 0 | OUTPATIENT
Start: 2022-02-21

## 2022-02-21 NOTE — TELEPHONE ENCOUNTER
Resent current Rx.  Faith Mckenna RN BSN  [facility]  [phone]

## 2022-02-21 NOTE — TELEPHONE ENCOUNTER
rosuvastatin (CRESTOR) 20 MG tablet  Last Written Prescription Date:  2/8/22  Last Fill Quantity: 90,  # refills: 1    Should have refill available.  Faith Mckenna RN BSN  [facility]  [phone]

## 2022-04-20 DIAGNOSIS — I10 BENIGN ESSENTIAL HYPERTENSION: ICD-10-CM

## 2022-04-20 RX ORDER — LABETALOL 200 MG/1
TABLET, FILM COATED ORAL
Qty: 180 TABLET | Refills: 1 | Status: SHIPPED | OUTPATIENT
Start: 2022-04-20 | End: 2022-07-12

## 2022-04-20 RX ORDER — AMLODIPINE BESYLATE 5 MG/1
TABLET ORAL
Qty: 90 TABLET | Refills: 1 | Status: SHIPPED | OUTPATIENT
Start: 2022-04-20 | End: 2022-07-12

## 2022-04-20 NOTE — TELEPHONE ENCOUNTER
"amLODIPine (NORVASC) 5 MG tablet  Last Written Prescription Date:  1/20/21  Last Fill Quantity: 90,  # refills: 3    labetalol (NORMODYNE) 200 MG tablet  Last Written Prescription Date:  1/20/21  Last Fill Quantity: 180,  # refills: 3    Last office visit:  8/11/21  Follow up recommended:  February 2022    Requested Prescriptions   Pending Prescriptions Disp Refills     labetalol (NORMODYNE) 200 MG tablet [Pharmacy Med Name: Labetalol HCl 200 MG Oral Tablet] 180 tablet 0     Sig: TAKE 1 TABLET BY MOUTH EVERY 12 HOURS       Beta-Blockers Protocol Passed - 4/20/2022  9:32 AM        Passed - Blood pressure under 140/90 in past 12 months     BP Readings from Last 3 Encounters:   08/11/21 133/88   01/20/21 101/64   02/01/20 124/79                 Passed - Patient is age 6 or older        Passed - Recent (12 mo) or future (30 days) visit within the authorizing provider's specialty     Patient has had an office visit with the authorizing provider or a provider within the authorizing providers department within the previous 12 mos or has a future within next 30 days. See \"Patient Info\" tab in inbasket, or \"Choose Columns\" in Meds & Orders section of the refill encounter.              Passed - Medication is active on med list           amLODIPine (NORVASC) 5 MG tablet [Pharmacy Med Name: amLODIPine Besylate 5 MG Oral Tablet] 90 tablet 0     Sig: TAKE 1 TABLET BY MOUTH ONCE DAILY . APPOINTMENT REQUIRED FOR FUTURE REFILLS       Calcium Channel Blockers Protocol  Passed - 4/20/2022  9:32 AM        Passed - Blood pressure under 140/90 in past 12 months     BP Readings from Last 3 Encounters:   08/11/21 133/88   01/20/21 101/64   02/01/20 124/79                 Passed - Recent (12 mo) or future (30 days) visit within the authorizing provider's specialty     Patient has had an office visit with the authorizing provider or a provider within the authorizing providers department within the previous 12 mos or has a future within next " "30 days. See \"Patient Info\" tab in inbasket, or \"Choose Columns\" in Meds & Orders section of the refill encounter.              Passed - Medication is active on med list        Passed - Patient is age 18 or older        Passed - Normal serum creatinine on file in past 12 months     Recent Labs   Lab Test 08/10/21  0942   CR 0.90       Ok to refill medication if creatinine is low             Prescription approved per [facility] Refill Protocol.  Faith Mckenna RN BSN  [facility]  [phone]          "

## 2022-04-29 ENCOUNTER — TELEPHONE (OUTPATIENT)
Dept: OTHER | Facility: CLINIC | Age: 62
End: 2022-04-29
Payer: COMMERCIAL

## 2022-04-29 NOTE — TELEPHONE ENCOUNTER
Future Appointments   Date Time Provider Department Center   5/24/2022  9:15 AM [facility]   6/2/2022  1:30 PM Nita Romero MD [facility]       Follow up to 8/11/21  Fasting labs  Follow up one week later; may be virtual.    Josue Amaya I   [place] [id number]  [place] [address] [phone]

## 2022-04-29 NOTE — TELEPHONE ENCOUNTER
[facility]    Who is the name of the provider?:  Nick      What is the location you see this provider at?: [place]    Reason for call:  Patient will be having labs at the [facility] on 5/25/22 instead of [facility].    Please fax lab orders to [phone]      Can we leave a detailed message on this number?  YES

## 2022-04-29 NOTE — TELEPHONE ENCOUNTER
Spoke with wife.  She may be calling back to provide us with a fax # for an [facility] to have the labs done instead of the [facility].

## 2022-06-23 DIAGNOSIS — I10 BENIGN ESSENTIAL HYPERTENSION: ICD-10-CM

## 2022-06-24 RX ORDER — RAMIPRIL 10 MG/1
CAPSULE ORAL
Qty: 90 CAPSULE | Refills: 0 | Status: SHIPPED | OUTPATIENT
Start: 2022-06-24 | End: 2022-07-12

## 2022-06-24 NOTE — TELEPHONE ENCOUNTER
"Last Written Prescription Date:  9/7/21  Last Fill Quantity: 90,  # refills: 1   Last office visit: 8/11/2021 with prescribing provider:     Future Office Visit:   Next 5 appointments (look out 90 days)    Jul 12, 2022  8:00 AM  Return Visit with Nita Romero MD  [facility] [place] ([facility] ) [address]  [place] [address]  [phone]           Requested Prescriptions   Pending Prescriptions Disp Refills     ramipril (ALTACE) 10 MG capsule [Pharmacy Med Name: Ramipril 10 MG Oral Capsule] 90 capsule 0     Sig: Take 1 capsule by mouth once daily       ACE Inhibitors (Including Combos) Protocol Passed - 6/23/2022  8:59 AM        Passed - Blood pressure under 140/90 in past 12 months     BP Readings from Last 3 Encounters:   08/11/21 133/88   01/20/21 101/64   02/01/20 124/79                 Passed - Recent (12 mo) or future (30 days) visit within the authorizing provider's specialty     Patient has had an office visit with the authorizing provider or a provider within the authorizing providers department within the previous 12 mos or has a future within next 30 days. See \"Patient Info\" tab in inbasket, or \"Choose Columns\" in Meds & Orders section of the refill encounter.              Passed - Medication is active on med list        Passed - Patient is age 18 or older        Passed - Normal serum creatinine on file in past 12 months     Recent Labs   Lab Test 08/10/21  0942   CR 0.90       Ok to refill medication if creatinine is low          Passed - Normal serum potassium on file in past 12 months     Recent Labs   Lab Test 08/10/21  0942   POTASSIUM 4.3                Prescription approved per [facility] Refill Protocol.    Lor ALMANZA RN    [facility]  Office: [phone]  Fax: [phone]        "

## 2022-07-12 ENCOUNTER — OFFICE VISIT (OUTPATIENT)
Dept: OTHER | Facility: CLINIC | Age: 62
End: 2022-07-12
Attending: INTERNAL MEDICINE
Payer: COMMERCIAL

## 2022-07-12 VITALS
OXYGEN SATURATION: 98 % | HEIGHT: 68 IN | SYSTOLIC BLOOD PRESSURE: 126 MMHG | HEART RATE: 67 BPM | WEIGHT: 178.2 LBS | BODY MASS INDEX: 27.01 KG/M2 | DIASTOLIC BLOOD PRESSURE: 80 MMHG

## 2022-07-12 DIAGNOSIS — I77.70 ARTERIAL DISSECTION (H): Primary | ICD-10-CM

## 2022-07-12 DIAGNOSIS — E78.5 HYPERLIPIDEMIA LDL GOAL <70: ICD-10-CM

## 2022-07-12 DIAGNOSIS — I10 BENIGN ESSENTIAL HYPERTENSION: ICD-10-CM

## 2022-07-12 PROCEDURE — G0463 HOSPITAL OUTPT CLINIC VISIT: HCPCS

## 2022-07-12 PROCEDURE — 99214 OFFICE O/P EST MOD 30 MIN: CPT | Performed by: INTERNAL MEDICINE

## 2022-07-12 RX ORDER — RAMIPRIL 5 MG/1
5 CAPSULE ORAL DAILY
Qty: 90 CAPSULE | Refills: 3 | Status: SHIPPED | OUTPATIENT
Start: 2022-07-12 | End: 2023-10-12

## 2022-07-12 RX ORDER — HYDROCODONE BITARTRATE AND ACETAMINOPHEN 5; 325 MG/1; MG/1
1-2 TABLET ORAL EVERY 6 HOURS PRN
Qty: 30 TABLET | Refills: 0 | Status: SHIPPED | OUTPATIENT
Start: 2022-07-12 | End: 2023-10-12

## 2022-07-12 RX ORDER — ROSUVASTATIN CALCIUM 20 MG/1
20 TABLET, COATED ORAL DAILY
Qty: 90 TABLET | Refills: 3 | Status: SHIPPED | OUTPATIENT
Start: 2022-07-12 | End: 2023-10-12

## 2022-07-12 RX ORDER — LABETALOL 200 MG/1
200 TABLET, FILM COATED ORAL 2 TIMES DAILY
Qty: 180 TABLET | Refills: 3 | Status: SHIPPED | OUTPATIENT
Start: 2022-07-12 | End: 2023-10-12

## 2022-07-12 RX ORDER — AMLODIPINE BESYLATE 5 MG/1
5 TABLET ORAL DAILY
Qty: 90 TABLET | Refills: 3 | Status: SHIPPED | OUTPATIENT
Start: 2022-07-12 | End: 2023-10-12

## 2022-07-12 NOTE — PATIENT INSTRUCTIONS
Recent labs looks good, continue same medications all of them refilled     Fasting lipids , CMP and CBC in one year then visit (can be done at primary office )

## 2022-07-12 NOTE — PROGRESS NOTES
CC:     Follow up visit, doing well. rarely using narco for pain. CTA in June 2017 healed dissection.  No abdominal pain.tolerating meds and well controlled BP and recent lipid panel outside excellent range     Med refills  Taking ramipril 5 mg daily     HPI: Stanley Garcia is a 62  year old year old patient who receives primary care from Erica Collado here today for the follow-up visit and he was initially  admitted to the [facility] on September 10, 2015-September 16, 2015 for extensive isolated acute SMA dissection without malperfusion.  It was thought spontaneous and his workup was unremarkable and negative.    Later he  gave a history of injury  one week before dissection( blunt injury to the abdomen) and he ran into edge of  heavy duty board during the Labor Day weekend ( one week before admission)      he is doing well, blood pressure is well controlled with multiple medications.  He is tolerating statin.  He stopped warfarin after one  year  and currently on aspirin 162 mg daily  He denies any chest pain, shortness of breath or palpitations.  No abdominal pain, no altered bowel movements.  No hematemesis or melena.    Previous  CTA 6/2017 completely healed dissection of SMA.  Reviewed CTA and recent outside labs in the [facility].    PAST MEDICAL HISTORY  Past Medical History:   Diagnosis Date     Anxiety      HTN (hypertension)      Superior mesenteric artery thrombosis (H)     acute extensive isolated SMA disection without malperfusion 9/2015 admitted to [facility]       CURRENT MEDICATIONS  Current Outpatient Medications   Medication Sig Dispense Refill     amLODIPine (NORVASC) 5 MG tablet TAKE 1 TABLET BY MOUTH ONCE DAILY . APPOINTMENT REQUIRED FOR FUTURE REFILLS 90 tablet 1     aspirin EC 81 MG tablet Take 162 mg by mouth       HYDROcodone-acetaminophen (NORCO) 5-325 MG tablet Take 1-2 tablets by mouth every 6 hours as needed for moderate to severe pain 30 tablet 0     labetalol (NORMODYNE) 200 MG tablet TAKE 1 TABLET BY  MOUTH EVERY 12 HOURS 180 tablet 1     multivitamin, therapeutic (THERA-VIT) TABS Take 1 tablet by mouth daily       Omega-3 Fatty Acids (FISH OIL PO)        order for DME Equipment being ordered: Blood pressure cuff 1 Device 0     ramipril (ALTACE) 10 MG capsule Take 1 capsule by mouth once daily 90 capsule 0     rosuvastatin (CRESTOR) 20 MG tablet Take 1 tablet (20 mg) by mouth daily 90 tablet 1       PAST SURGICAL HISTORY:  Past Surgical History:   Procedure Laterality Date     ORTHOPEDIC SURGERY      left ankle       ALLERGIES   No Known Allergies    FAMILY HISTORY  Family History   Problem Relation Age of Onset     Cerebrovascular Disease Mother      Other Cancer Father         esophageal       VASCULAR FAMILY HISTORY  1st order relative with atherosclerotic PAD: No  1st order relative with AAA: No    SOCIAL HISTORY  Social History     Socioeconomic History     Marital status:      Spouse name: Kerry     Number of children: 3     Years of education: Not on file     Highest education level: Not on file   Occupational History     Occupation: fork lift      Comment: [employer]   Tobacco Use     Smoking status: Never Smoker     Smokeless tobacco: Former User     Tobacco comment: quit at age 29 ( chewed tobacco for 10 years??)   Substance and Sexual Activity     Alcohol use: Not Currently     Comment: occ beer      Drug use: No     Sexual activity: Yes     Partners: Female   Other Topics Concern     Not on file   Social History Narrative     Not on file     Social Determinants of Health     Financial Resource Strain: Not on file   Food Insecurity: Not on file   Transportation Needs: Not on file   Physical Activity: Not on file   Stress: Not on file   Social Connections: Not on file   Intimate Partner Violence: Not on file   Housing Stability: Not on file       ROS:   Constitutional: No fever, chills, or sweats. No weight gain/loss   ENT: No visual disturbance, ear ache, epistaxis, sore  "throat  Allergies/Immunologic: Negative  Respiratory: No cough, hemoptysia  Cardiovascular: As per HPI  GI: No nausea, vomiting, hematemesis, melena, or hematochezia  : No urinary frequency, dysuria, or hematuria  Integument: Negative  Psychiatric: Negative  Neuro: Negative  Endocrinology: Negative   Musculoskeletal: Negative  Vascular: No walking impairment, claudication, ischemic rest pain or nonhealing wounds    EXAM:  /80 (BP Location: Right arm, Patient Position: Chair, Cuff Size: Adult Large)   Pulse 67   Ht 5' 8\" (1.727 m)   Wt 178 lb 3.2 oz (80.8 kg)   SpO2 98%   BMI 27.10 kg/m    In general, the patient is a pleasant male in no apparent distress.    HEENT: NC/AT.  PERRLA.  EOMI.  Sclerae white, not injected.  Nares clear.  Pharynx without erythema or exudate.  Dentition intact.    Neck: No adenopathy.  No thyromegaly. Carotids +2/2 bilaterally without bruits.  No jugular venous distension.   Heart: RRR. Normal S1, S2 splits physiologically. No murmur, rub, click, or gallop. The PMI is in the 5th ICS in the midclavicular line. There is no heave.    Lungs: CTA.  No ronchi, wheezes, rales.  No dullness to percussion.   Abdomen: Soft, nontender, nondistended. No organomegaly. No AAA.  No bruits.   Extremities: No clubbing, cyanosis, or edema.  No wounds. No varicose veins signs of chronic venous insufficiency.   Vascular: No bruits are noted.   Brachial Radial Ulnar Femoral Popliteal DP PT   Left 2/2 2/2 2/2 2/2 2/2 2/2 2/2   Right 2/2 2/2 2/2 2/2 2/2 2/2 2/2     Labs:  LIPID RESULTS:  Lab Results   Component Value Date    CHOL 134 08/10/2021    CHOL 145 10/21/2016    HDL 42 08/10/2021    HDL 39 (L) 10/21/2016    LDL 76 08/10/2021    LDL 83 10/21/2016    TRIG 80 08/10/2021    TRIG 115 10/21/2016    CHOLHDLRATIO 3.6 10/20/2015       LIVER ENZYME RESULTS:  Lab Results   Component Value Date    AST 15 08/10/2021    AST 20 06/14/2017    ALT 35 08/10/2021    ALT 46 06/14/2017       CBC RESULTS:  Lab " Results   Component Value Date    WBC 8.8 01/08/2016    RBC 4.99 01/08/2016    HGB 15.9 01/08/2016    HCT 43.7 01/08/2016    MCV 88 01/08/2016    MCH 31.9 01/08/2016    MCHC 36.4 01/08/2016    RDW 12.5 01/08/2016     01/08/2016       BMP RESULTS:  Lab Results   Component Value Date     08/10/2021     06/14/2017    POTASSIUM 4.3 08/10/2021    POTASSIUM 4.0 06/14/2017    CHLORIDE 106 08/10/2021    CHLORIDE 106 06/14/2017    CO2 32 08/10/2021    CO2 30 06/14/2017    ANIONGAP 1 (L) 08/10/2021    ANIONGAP 4 06/14/2017     (H) 08/10/2021     (H) 06/14/2017    BUN 15 08/10/2021    BUN 17 06/14/2017    CR 0.90 08/10/2021    CR 0.84 06/14/2017    GFRESTIMATED >90 08/10/2021    GFRESTIMATED >90  Non  GFR Calc   06/14/2017    GFRESTBLACK >90   GFR Calc   06/14/2017    ALONDRA 9.3 08/10/2021    ALONDRA 8.8 06/14/2017        A1C RESULTS:  Lab Results   Component Value Date    A1C 5.3 09/11/2015       Procedures:  CTA ANGIOGRAM ABDOMEN 10/13/2015 12:13 PM   HISTORY: Follow up superior mesenteric artery dissection.  COMPARISON: 9/14/2015.  TECHNIQUE: IV contrast CT angiography is performed through the abdomen  and pelvis utilizing 80 mL of Isovue-370. 3D reconstructions were  performed at an independent workstation.  FINDINGS: Again noted is a dissection of the proximal and mid  superior mesenteric artery beginning just beyond its origin and  extending to involve the origins of the first several jejunal  branches. Since the previous study there has been enlargement of the  false lumen with increased pooling of contrast via fenestrations into  the false lumen approximately 2 cm from the vessel origin. The  contrast collection pooling in this region now measures approximately  25 x 10 mm compared to 13 x 7 mm previously. As before, this is  associated with diffuse narrowing of the true lumen in this region,  although the vessel remains patent with filling of the distal  jejunal  branches. As before, the remaining false lumen is unopacified and  presumably is thrombosed. The celiac trunk, the inferior mesenteric  artery, and the renal arteries remain patent and unchanged. The  abdominal aorta and the iliac vessels are normal and unchanged. There  is no evidence of mural edema to suggest bowel ischemia, although the  accuracy of this is diminished due to the lack of oral contrast.  Remainder of the abdomen and pelvis is unchanged.  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 There  is persistent but stable narrowing of the true lumen of the proximal  and mid superior mesenteric artery which does not appear to be  progressively compromised despite the enlarging false lumen. The  remaining visceral vessels as well as the aortoiliac vessels remain  normal and unchanged. No evidence of mural edema to suggest bowel  ischemia on this nonoral contrast scan.  RENZO BURCIAGA MD        CTA ANGIOGRAM ABDOMEN AND PELVIS  5/19/2016 1:58 PM       HISTORY:  Follow up superior mesenteric artery dissection.     COMPARISON:  1/8/2016.     TECHNIQUE: IV contrast CT angiography is performed through the abdomen  and pelvis utilizing 80 mL of Isovue-370. 3D reconstructions were  performed at an independent workstation.     FINDINGS: Again noted is a localized dissection of the superior  mesenteric artery beginning approximately 2 cm from its origin. Since  the previous study there has been almost complete thrombosis of the  false lumen with improvement in the degree of extrinsic compression of  the true lumen which is now almost completely re-expanded to normal  caliber. There is now only minimal opacification of the false lumen  with contrast. There is no evidence of expansion of the dissection.     The celiac trunk, the inferior mesenteric artery, and the renal  arteries remain widely patent. The  abdominal aorta and the common  iliac arteries normal.     There are stable multiple hepatic and right lower pole renal cysts.  There is a small stable nodule involving the medial aspect of the  right adrenal gland. There also appears to be mild stable thickening  or small nodule in the medial aspect of the left adrenal gland. These  are unchanged. Remainder of the abdomen and pelvis is negative except  for hypertrophic degenerative changes in the lumbar spine.                                                                       IMPRESSION:    1. Interval partial thrombosis of the false lumen of the previous  superior mesenteric artery localized dissection with resultant  improvement in the luminal diameter of the true lumen with reduction  in extrinsic compression of the true lumen.  2. The remaining visceral vessels as well as the abdominal aorta and  visualized iliac arteries are normal.  3. Stable hepatic and right renal cyst.  4. Stable thickening or small nodule in the right adrenal gland and  stable mild thickening in the left adrenal gland.       RENZO BURCIAGA MD        CTA ANGIOGRAM ABDOMEN AND PELVIS  6/14/2017  1:16 PM     HISTORY: 57-year-old patient with history of superior mesenteric  arterial dissection.     COMPARISON: May 19, 2016.     TECHNIQUE: Multiplanar and multi formatted CTA images were obtained  from the lung bases through the abdomen and pelvis after the  uneventful administration of Isovue-370 intravenous contrast given for  a total of 80 mL. Radiation dose for this scan was reduced using  automated exposure control, adjustment of the mA and/or kV according  to patient size, or iterative reconstruction technique. 3-D  reformatted images were created at a separate workstation.     FINDINGS: The visible lung bases are clear. Heart size is normal. No  pleural effusion or pericardial effusion. Multiple hepatic cysts are  identified. The largest is in the left hepatic lobe measuring  4  Hounsfield units and 3.7 x 4.2 cm, relatively unchanged from a  previous exam. The gallbladder, spleen, adrenal glands, and pancreas  are unremarkable. Both kidneys are normally perfused and without  hydronephrosis. Right renal hypodensities again identified and  unchanged. No intraperitoneal fluid or air. Bladder is mostly  decompressed and unremarkable. Sigmoid colon diverticulosis, without  evidence of diverticulitis. Appendix is normal in size and appearance  in the right lower quadrant. No acute osseous abnormality.     The abdominal aorta is normal in size and appearance. The celiac axis,  SMA, bilateral renal, the inferior mesenteric arteries are patent.  Both common iliac, internal iliac, external iliac, common femoral  arteries are patent. Small focal dissection is again visible in the  superior mesenteric artery. Widest diameter is up to 9 mm AP,  unchanged from previous exam. The appearance of a possible dissection  is best identified on axial CT imaging, though difficult to visualize  on sagittal imaging. In comparison to previous CT exam on January 8, 2016, suspect this is an essentially healed dissection without  residual stenosis.         IMPRESSION:  1. Patent visceral arteries. Specifically, previously identified  dissection in the superior mesenteric artery appears to have  essentially completely healed. Minimal residual irregularity, though  not flow-limiting and unchanged from most recent CT exam on May 19,  2016. No new or additional dissections.  2. Unchanged hepatic and right renal cysts.     EMIGDIO WISEMAN MD    CTA ANGIOGRAM ABDOMEN AND PELVIS  6/14/2017  1:16 PM     HISTORY: 57-year-old patient with history of superior mesenteric  arterial dissection.     COMPARISON: May 19, 2016.     TECHNIQUE: Multiplanar and multi formatted CTA images were obtained  from the lung bases through the abdomen and pelvis after the  uneventful administration of Isovue-370 intravenous contrast given for  a  total of 80 mL. Radiation dose for this scan was reduced using  automated exposure control, adjustment of the mA and/or kV according  to patient size, or iterative reconstruction technique. 3-D  reformatted images were created at a separate workstation.     FINDINGS: The visible lung bases are clear. Heart size is normal. No  pleural effusion or pericardial effusion. Multiple hepatic cysts are  identified. The largest is in the left hepatic lobe measuring 4  Hounsfield units and 3.7 x 4.2 cm, relatively unchanged from a  previous exam. The gallbladder, spleen, adrenal glands, and pancreas  are unremarkable. Both kidneys are normally perfused and without  hydronephrosis. Right renal hypodensities again identified and  unchanged. No intraperitoneal fluid or air. Bladder is mostly  decompressed and unremarkable. Sigmoid colon diverticulosis, without  evidence of diverticulitis. Appendix is normal in size and appearance  in the right lower quadrant. No acute osseous abnormality.     The abdominal aorta is normal in size and appearance. The celiac axis,  SMA, bilateral renal, the inferior mesenteric arteries are patent.  Both common iliac, internal iliac, external iliac, common femoral  arteries are patent. Small focal dissection is again visible in the  superior mesenteric artery. Widest diameter is up to 9 mm AP,  unchanged from previous exam. The appearance of a possible dissection  is best identified on axial CT imaging, though difficult to visualize  on sagittal imaging. In comparison to previous CT exam on January 8, 2016, suspect this is an essentially healed dissection without  residual stenosis.         IMPRESSION:  1. Patent visceral arteries. Specifically, previously identified  dissection in the superior mesenteric artery appears to have  essentially completely healed. Minimal residual irregularity, though  not flow-limiting and unchanged from most recent CT exam on May 19,  2016. No new or additional  dissections.  2. Unchanged hepatic and right renal cysts.     EMIGDIO WISEMAN MD    Assessment and Plan:     1.  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    Doing well with current meds    Currently on multiple BP meds with excellent control, taking ramipril 5 mg only   Recent lipids are excellent range   He denies any CP, Abdominal pain, no weight loss or postprandial issues.    He has no family history of connective tissue disorders and his clinical exam did not fit with Marfan's, EDS or LDS. In addition, his rheumatoid factor, TRINA, and vasculitis panel were all negative.  He is currently  on multiple antihypertensives, including amlodipine, labetalol, and ramipril. He is taking crestor 20  mg daily for pleiotropic benefit.No abdominal pain, hematemesis or melena.  His blood pressure is well controlled.     He underwent repeat CTA in 6/2017 As delineated above, completely healed dissection.    Reviewed recent lab results in care everywhere , all of them are good.     Plan:  Continue Crestor 20 mg daily   Continue  aspirin 162 mg daily with food.  Maintain good BP  Will plan for CTA if clinical status changes.  Repeat fasting lipids and other labs in 12months at primary then follow-up with me    2. Hypertension    His blood pressure is well controlled     Tolerating medications without any problem.  Stopped hydrochlorothiazide due to elevated triglycerides  Continue rest of the blood pressure medications same  Monitor blood pressure and goal is less than 130/80  Check CMP  Refilled all meds   He was taking Ramipril 5 mg daily   Refilled all meds    3. Hyperlipidemia:  Doing well with Crestor 20 mg daily recent lipids are well controlled continue the same  medications refilled repeat fasting lipids in 1 year at his primary MD        Written instructions with AVS given return to clinic in 12 months    30 minutes spent on the date of the encounter doing chart review, history and exam, documentation and further activities as noted above      This note was dictated by utilizing dragon software  Cc: Primary care physician     Nita Romero MD, JOVAN, [facility]  Vascular Medicine  Clinical lipidologist  Clinical hypertension specialist

## 2022-07-12 NOTE — PROGRESS NOTES
"Patient is here to discuss follow up.    /80 (BP Location: Right arm, Patient Position: Chair, Cuff Size: Adult Large)   Pulse 67   Ht 5' 8\" (1.727 m)   Wt 178 lb 3.2 oz (80.8 kg)   SpO2 98%   BMI 27.10 kg/m      Questions patient would like addressed today are: N/A.    Refills are needed: Yes: HYDROcodone and rosuvastatin.    Has homecare services and agency name:  [facility]  "

## 2022-10-10 ENCOUNTER — HEALTH MAINTENANCE LETTER (OUTPATIENT)
Age: 62
End: 2022-10-10

## 2022-12-13 DIAGNOSIS — I10 BENIGN ESSENTIAL HYPERTENSION: ICD-10-CM

## 2022-12-14 RX ORDER — RAMIPRIL 10 MG/1
CAPSULE ORAL
Qty: 90 CAPSULE | Refills: 0 | OUTPATIENT
Start: 2022-12-14

## 2022-12-14 NOTE — TELEPHONE ENCOUNTER
ramipril (ALTACE) 5 MG capsule  Last Written Prescription Date:  7/12/22  Last Fill Quantity: 90,  # refills: 3    One year Rx 7/12/22.

## 2023-02-18 ENCOUNTER — HEALTH MAINTENANCE LETTER (OUTPATIENT)
Age: 63
End: 2023-02-18

## 2023-10-12 ENCOUNTER — OFFICE VISIT (OUTPATIENT)
Dept: OTHER | Facility: CLINIC | Age: 63
End: 2023-10-12
Attending: INTERNAL MEDICINE
Payer: COMMERCIAL

## 2023-10-12 VITALS
SYSTOLIC BLOOD PRESSURE: 134 MMHG | WEIGHT: 178 LBS | HEART RATE: 68 BPM | HEIGHT: 68 IN | OXYGEN SATURATION: 98 % | DIASTOLIC BLOOD PRESSURE: 92 MMHG | BODY MASS INDEX: 26.98 KG/M2

## 2023-10-12 DIAGNOSIS — I77.70 ARTERIAL DISSECTION (H): Primary | ICD-10-CM

## 2023-10-12 DIAGNOSIS — E78.5 HYPERLIPIDEMIA LDL GOAL <70: ICD-10-CM

## 2023-10-12 DIAGNOSIS — I10 BENIGN ESSENTIAL HYPERTENSION: ICD-10-CM

## 2023-10-12 DIAGNOSIS — K40.90 UNILATERAL INGUINAL HERNIA WITHOUT OBSTRUCTION OR GANGRENE, RECURRENCE NOT SPECIFIED: ICD-10-CM

## 2023-10-12 PROCEDURE — 99215 OFFICE O/P EST HI 40 MIN: CPT | Performed by: INTERNAL MEDICINE

## 2023-10-12 PROCEDURE — 99213 OFFICE O/P EST LOW 20 MIN: CPT | Performed by: INTERNAL MEDICINE

## 2023-10-12 RX ORDER — AMLODIPINE BESYLATE 5 MG/1
5 TABLET ORAL DAILY
Qty: 90 TABLET | Refills: 3 | Status: SHIPPED | OUTPATIENT
Start: 2023-10-12

## 2023-10-12 RX ORDER — ROSUVASTATIN CALCIUM 20 MG/1
20 TABLET, COATED ORAL DAILY
Qty: 90 TABLET | Refills: 3 | Status: SHIPPED | OUTPATIENT
Start: 2023-10-12

## 2023-10-12 RX ORDER — RAMIPRIL 5 MG/1
5 CAPSULE ORAL DAILY
Qty: 90 CAPSULE | Refills: 3 | Status: SHIPPED | OUTPATIENT
Start: 2023-10-12

## 2023-10-12 RX ORDER — HYDROCODONE BITARTRATE AND ACETAMINOPHEN 5; 325 MG/1; MG/1
1-2 TABLET ORAL EVERY 6 HOURS PRN
Qty: 30 TABLET | Refills: 0 | Status: SHIPPED | OUTPATIENT
Start: 2023-10-12

## 2023-10-12 RX ORDER — LABETALOL 200 MG/1
200 TABLET, FILM COATED ORAL 2 TIMES DAILY
Qty: 180 TABLET | Refills: 3 | Status: SHIPPED | OUTPATIENT
Start: 2023-10-12

## 2023-10-12 NOTE — PROGRESS NOTES
"Patient is here to discuss follow up    BP (!) 134/92 (BP Location: Right arm, Patient Position: Chair, Cuff Size: Adult Regular)   Pulse 68   Ht 5' 8\" (1.727 m)   Wt 178 lb (80.7 kg)   SpO2 98%   BMI 27.06 kg/m      Questions patient would like addressed today are: N/A.    Refills are needed: No    Has homecare services and agency name:  [facility]    "

## 2023-10-12 NOTE — PATIENT INSTRUCTIONS
Reviewed recent outside labs, looks good , continue same medications and all of them refilled     For your upcoming hernia surgery please go for Open repair ( avoid laparoscopic surgery with your history of SMA dissection in the past )    Follow up with me in a year

## 2023-10-12 NOTE — PROGRESS NOTES
CC:     Follow up visit,   Reviewed of outside labs   doing well. rarely using narco for pain. CTA in June 2017 healed dissection.  No abdominal pain.tolerating meds and well controlled BP and recent lipid panel outside excellent range   Med refills  Planning for right-sided inguinal hernia repair in the near future      HPI: Stanley Garcia is a 63  year old year old patient who receives primary care from Erica Collado here today for the follow-up visit and he was initially  admitted to the [facility] on September 10, 2015-September 16, 2015 for extensive isolated acute SMA dissection without malperfusion.  It was thought spontaneous and his workup was unremarkable and negative.    Later he  gave a history of injury  one week before dissection( blunt injury to the abdomen) and he ran into edge of  heavy duty board during the Labor Day weekend ( one week before admission)      he is doing well, blood pressure is well controlled with multiple medications.  He is tolerating statin.  He stopped warfarin after one  year  and currently on aspirin 81 mg daily  He denies any chest pain, shortness of breath or palpitations.  No abdominal pain, no altered bowel movements.  No hematemesis or melena.    Previous  CTA 6/2017 completely healed dissection of SMA.  Reviewed CTA and recent outside labs in the epic.    He developed right-sided inguinal hernia asymptomatic planning for surgery in the near future  Requesting medication refill    PAST MEDICAL HISTORY  Past Medical History:   Diagnosis Date    Anxiety     HTN (hypertension)     Superior mesenteric artery thrombosis (H24)     acute extensive isolated SMA disection without malperfusion 9/2015 admitted to [facility]       CURRENT MEDICATIONS  Current Outpatient Medications   Medication Sig Dispense Refill    amLODIPine (NORVASC) 5 MG tablet Take 1 tablet (5 mg) by mouth daily Refill when due 90 tablet 3    aspirin EC 81 MG tablet Take 162 mg by mouth      HYDROcodone-acetaminophen (NORCO)  5-325 MG tablet Take 1-2 tablets by mouth every 6 hours as needed for moderate to severe pain 30 tablet 0    labetalol (NORMODYNE) 200 MG tablet Take 1 tablet (200 mg) by mouth 2 times daily Refill when due 180 tablet 3    multivitamin, therapeutic (THERA-VIT) TABS Take 1 tablet by mouth daily      Omega-3 Fatty Acids (FISH OIL PO)       order for DME Equipment being ordered: Blood pressure cuff 1 Device 0    ramipril (ALTACE) 5 MG capsule Take 1 capsule (5 mg) by mouth daily 90 capsule 3    rosuvastatin (CRESTOR) 20 MG tablet Take 1 tablet (20 mg) by mouth daily 90 tablet 3       PAST SURGICAL HISTORY:  Past Surgical History:   Procedure Laterality Date    ORTHOPEDIC SURGERY      left ankle       ALLERGIES   No Known Allergies    FAMILY HISTORY  Family History   Problem Relation Age of Onset    Cerebrovascular Disease Mother     Other Cancer Father         esophageal       VASCULAR FAMILY HISTORY  1st order relative with atherosclerotic PAD: No  1st order relative with AAA: No    SOCIAL HISTORY  Social History     Socioeconomic History    Marital status:      Spouse name: Kerry    Number of children: 3    Years of education: Not on file    Highest education level: Not on file   Occupational History    Occupation: fork lift      Comment: city [employer]   Tobacco Use    Smoking status: Never    Smokeless tobacco: Former    Tobacco comments:     quit at age 29 ( chewed tobacco for 10 years??)   Substance and Sexual Activity    Alcohol use: Not Currently     Comment: occ beer     Drug use: No    Sexual activity: Yes     Partners: Female   Other Topics Concern    Not on file   Social History Narrative    Not on file     Social Determinants of Health     Financial Resource Strain: Not on file   Food Insecurity: Not on file   Transportation Needs: Not on file   Physical Activity: Not on file   Stress: Not on file   Social Connections: Not on file   Interpersonal Safety: Not on file   Housing Stability: Not on file  "      ROS:   Constitutional: No fever, chills, or sweats. No weight gain/loss   ENT: No visual disturbance, ear ache, epistaxis, sore throat  Allergies/Immunologic: Negative  Respiratory: No cough, hemoptysia  Cardiovascular: As per HPI  GI: No nausea, vomiting, hematemesis, melena, or hematochezia  : No urinary frequency, dysuria, or hematuria  Integument: Negative  Psychiatric: Negative  Neuro: Negative  Endocrinology: Negative   Musculoskeletal: Negative  Vascular: No walking impairment, claudication, ischemic rest pain or nonhealing wounds    EXAM:  BP (!) 134/92 (BP Location: Right arm, Patient Position: Chair, Cuff Size: Adult Regular)   Pulse 68   Ht 5' 8\" (1.727 m)   Wt 178 lb (80.7 kg)   SpO2 98%   BMI 27.06 kg/m    In general, the patient is a pleasant male in no apparent distress.    HEENT: NC/AT.  PERRLA.  EOMI.  Sclerae white, not injected.  Nares clear.  Pharynx without erythema or exudate.  Dentition intact.    Neck: No adenopathy.  No thyromegaly. Carotids +2/2 bilaterally without bruits.  No jugular venous distension.   Heart: RRR. Normal S1, S2 splits physiologically. No murmur, rub, click, or gallop. The PMI is in the 5th ICS in the midclavicular line. There is no heave.    Lungs: CTA.  No ronchi, wheezes, rales.  No dullness to percussion.   Abdomen: Soft, nontender, nondistended. No organomegaly. No AAA.  No bruits.   He has a right-sided inguinal hernia  Extremities: No clubbing, cyanosis, or edema.  No wounds. No varicose veins signs of chronic venous insufficiency.   Vascular: No bruits are noted.   Brachial Radial Ulnar Femoral Popliteal DP PT   Left 2/2 2/2 2/2 2/2 2/2 2/2 2/2   Right 2/2 2/2 2/2 2/2 2/2 2/2 2/2     Labs:  LIPID RESULTS:  Lab Results   Component Value Date    CHOL 134 08/10/2021    CHOL 145 10/21/2016    HDL 42 08/10/2021    HDL 39 (L) 10/21/2016    LDL 76 08/10/2021    LDL 83 10/21/2016    TRIG 80 08/10/2021    TRIG 115 10/21/2016    ELBA 3.6 10/20/2015 "       LIVER ENZYME RESULTS:  Lab Results   Component Value Date    AST 15 08/10/2021    AST 20 06/14/2017    ALT 35 08/10/2021    ALT 46 06/14/2017       CBC RESULTS:  Lab Results   Component Value Date    WBC 8.8 01/08/2016    RBC 4.99 01/08/2016    HGB 15.9 01/08/2016    HCT 43.7 01/08/2016    MCV 88 01/08/2016    MCH 31.9 01/08/2016    MCHC 36.4 01/08/2016    RDW 12.5 01/08/2016     01/08/2016       BMP RESULTS:  Lab Results   Component Value Date     08/10/2021     06/14/2017    POTASSIUM 4.3 08/10/2021    POTASSIUM 4.0 06/14/2017    CHLORIDE 106 08/10/2021    CHLORIDE 106 06/14/2017    CO2 32 08/10/2021    CO2 30 06/14/2017    ANIONGAP 1 (L) 08/10/2021    ANIONGAP 4 06/14/2017     (H) 08/10/2021     (H) 06/14/2017    BUN 15 08/10/2021    BUN 17 06/14/2017    CR 0.90 08/10/2021    CR 0.84 06/14/2017    GFRESTIMATED >90 08/10/2021    GFRESTIMATED >90  Non  GFR Calc   06/14/2017    GFRESTBLACK >90   GFR Calc   06/14/2017    ALONDRA 9.3 08/10/2021    ALONDRA 8.8 06/14/2017        A1C RESULTS:  Lab Results   Component Value Date    A1C 5.3 09/11/2015       Procedures:  CTA ANGIOGRAM ABDOMEN 10/13/2015 12:13 PM   HISTORY: Follow up superior mesenteric artery dissection.  COMPARISON: 9/14/2015.  TECHNIQUE: IV contrast CT angiography is performed through the abdomen  and pelvis utilizing 80 mL of Isovue-370. 3D reconstructions were  performed at an independent workstation.  FINDINGS: Again noted is a dissection of the proximal and mid  superior mesenteric artery beginning just beyond its origin and  extending to involve the origins of the first several jejunal  branches. Since the previous study there has been enlargement of the  false lumen with increased pooling of contrast via fenestrations into  the false lumen approximately 2 cm from the vessel origin. The  contrast collection pooling in this region now measures approximately  25 x 10 mm compared to 13 x 7  mm previously. As before, this is  associated with diffuse narrowing of the true lumen in this region,  although the vessel remains patent with filling of the distal jejunal  branches. As before, the remaining false lumen is unopacified and  presumably is thrombosed. The celiac trunk, the inferior mesenteric  artery, and the renal arteries remain patent and unchanged. The  abdominal aorta and the iliac vessels are normal and unchanged. There  is no evidence of mural edema to suggest bowel ischemia, although the  accuracy of this is diminished due to the lack of oral contrast.  Remainder of the abdomen and pelvis is unchanged.  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 There  is persistent but stable narrowing of the true lumen of the proximal  and mid superior mesenteric artery which does not appear to be  progressively compromised despite the enlarging false lumen. The  remaining visceral vessels as well as the aortoiliac vessels remain  normal and unchanged. No evidence of mural edema to suggest bowel  ischemia on this nonoral contrast scan.  RENZO BURCIAGA MD        CTA ANGIOGRAM ABDOMEN AND PELVIS  5/19/2016 1:58 PM       HISTORY:  Follow up superior mesenteric artery dissection.     COMPARISON:  1/8/2016.     TECHNIQUE: IV contrast CT angiography is performed through the abdomen  and pelvis utilizing 80 mL of Isovue-370. 3D reconstructions were  performed at an independent workstation.     FINDINGS: Again noted is a localized dissection of the superior  mesenteric artery beginning approximately 2 cm from its origin. Since  the previous study there has been almost complete thrombosis of the  false lumen with improvement in the degree of extrinsic compression of  the true lumen which is now almost completely re-expanded to normal  caliber. There is now only minimal opacification of the false lumen  with  contrast. There is no evidence of expansion of the dissection.     The celiac trunk, the inferior mesenteric artery, and the renal  arteries remain widely patent. The abdominal aorta and the common  iliac arteries normal.     There are stable multiple hepatic and right lower pole renal cysts.  There is a small stable nodule involving the medial aspect of the  right adrenal gland. There also appears to be mild stable thickening  or small nodule in the medial aspect of the left adrenal gland. These  are unchanged. Remainder of the abdomen and pelvis is negative except  for hypertrophic degenerative changes in the lumbar spine.                                                                       IMPRESSION:    1. Interval partial thrombosis of the false lumen of the previous  superior mesenteric artery localized dissection with resultant  improvement in the luminal diameter of the true lumen with reduction  in extrinsic compression of the true lumen.  2. The remaining visceral vessels as well as the abdominal aorta and  visualized iliac arteries are normal.  3. Stable hepatic and right renal cyst.  4. Stable thickening or small nodule in the right adrenal gland and  stable mild thickening in the left adrenal gland.       RENZO BURCIAGA MD        CTA ANGIOGRAM ABDOMEN AND PELVIS  6/14/2017  1:16 PM     HISTORY: 57-year-old patient with history of superior mesenteric  arterial dissection.     COMPARISON: May 19, 2016.     TECHNIQUE: Multiplanar and multi formatted CTA images were obtained  from the lung bases through the abdomen and pelvis after the  uneventful administration of Isovue-370 intravenous contrast given for  a total of 80 mL. Radiation dose for this scan was reduced using  automated exposure control, adjustment of the mA and/or kV according  to patient size, or iterative reconstruction technique. 3-D  reformatted images were created at a separate workstation.     FINDINGS: The visible lung bases are clear.  Heart size is normal. No  pleural effusion or pericardial effusion. Multiple hepatic cysts are  identified. The largest is in the left hepatic lobe measuring 4  Hounsfield units and 3.7 x 4.2 cm, relatively unchanged from a  previous exam. The gallbladder, spleen, adrenal glands, and pancreas  are unremarkable. Both kidneys are normally perfused and without  hydronephrosis. Right renal hypodensities again identified and  unchanged. No intraperitoneal fluid or air. Bladder is mostly  decompressed and unremarkable. Sigmoid colon diverticulosis, without  evidence of diverticulitis. Appendix is normal in size and appearance  in the right lower quadrant. No acute osseous abnormality.     The abdominal aorta is normal in size and appearance. The celiac axis,  SMA, bilateral renal, the inferior mesenteric arteries are patent.  Both common iliac, internal iliac, external iliac, common femoral  arteries are patent. Small focal dissection is again visible in the  superior mesenteric artery. Widest diameter is up to 9 mm AP,  unchanged from previous exam. The appearance of a possible dissection  is best identified on axial CT imaging, though difficult to visualize  on sagittal imaging. In comparison to previous CT exam on January 8, 2016, suspect this is an essentially healed dissection without  residual stenosis.         IMPRESSION:  1. Patent visceral arteries. Specifically, previously identified  dissection in the superior mesenteric artery appears to have  essentially completely healed. Minimal residual irregularity, though  not flow-limiting and unchanged from most recent CT exam on May 19,  2016. No new or additional dissections.  2. Unchanged hepatic and right renal cysts.     EMIGDIO WISEMAN MD    CTA ANGIOGRAM ABDOMEN AND PELVIS  6/14/2017  1:16 PM     HISTORY: 57-year-old patient with history of superior mesenteric  arterial dissection.     COMPARISON: May 19, 2016.     TECHNIQUE: Multiplanar and multi formatted CTA  images were obtained  from the lung bases through the abdomen and pelvis after the  uneventful administration of Isovue-370 intravenous contrast given for  a total of 80 mL. Radiation dose for this scan was reduced using  automated exposure control, adjustment of the mA and/or kV according  to patient size, or iterative reconstruction technique. 3-D  reformatted images were created at a separate workstation.     FINDINGS: The visible lung bases are clear. Heart size is normal. No  pleural effusion or pericardial effusion. Multiple hepatic cysts are  identified. The largest is in the left hepatic lobe measuring 4  Hounsfield units and 3.7 x 4.2 cm, relatively unchanged from a  previous exam. The gallbladder, spleen, adrenal glands, and pancreas  are unremarkable. Both kidneys are normally perfused and without  hydronephrosis. Right renal hypodensities again identified and  unchanged. No intraperitoneal fluid or air. Bladder is mostly  decompressed and unremarkable. Sigmoid colon diverticulosis, without  evidence of diverticulitis. Appendix is normal in size and appearance  in the right lower quadrant. No acute osseous abnormality.     The abdominal aorta is normal in size and appearance. The celiac axis,  SMA, bilateral renal, the inferior mesenteric arteries are patent.  Both common iliac, internal iliac, external iliac, common femoral  arteries are patent. Small focal dissection is again visible in the  superior mesenteric artery. Widest diameter is up to 9 mm AP,  unchanged from previous exam. The appearance of a possible dissection  is best identified on axial CT imaging, though difficult to visualize  on sagittal imaging. In comparison to previous CT exam on January 8, 2016, suspect this is an essentially healed dissection without  residual stenosis.         IMPRESSION:  1. Patent visceral arteries. Specifically, previously identified  dissection in the superior mesenteric artery appears to have  essentially  completely healed. Minimal residual irregularity, though  not flow-limiting and unchanged from most recent CT exam on May 19,  2016. No new or additional dissections.  2. Unchanged hepatic and right renal cysts.     EMIGDIO WISEMAN MD    Assessment and Plan:     1.  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    Doing well with current meds    Currently on multiple BP meds with excellent control, taking ramipril 5 mg only   Recent lipids are excellent range   He denies any CP, Abdominal pain, no weight loss or postprandial issues.    He has no family history of connective tissue disorders and his clinical exam did not fit with Marfan's, EDS or LDS. In addition, his rheumatoid factor, TRINA, and vasculitis panel were all negative.  He is currently  on multiple antihypertensives, including amlodipine, labetalol, and ramipril. He is taking crestor 20  mg daily for pleiotropic benefit.No abdominal pain, hematemesis or melena.  His blood pressure is well controlled.     He underwent repeat CTA in 6/2017 As delineated above, completely healed dissection.    Reviewed recent lab results in care everywhere , all of them are good.     Plan:  Continue Crestor 20 mg daily   Continue  aspirin 81 mg daily with food.  Maintain good BP  Will plan for CTA if clinical status changes.  Repeat fasting lipids and other labs in 12months at primary then follow-up with me    2. Hypertension    His blood pressure is well controlled     Tolerating medications without any problem.  Stopped hydrochlorothiazide due to elevated triglycerides  Continue rest of the blood pressure medications same  Monitor blood pressure and goal is less than 130/80  Check CMP  Refilled all meds       3.  Hyperlipidemia:  Doing well with Crestor 20 mg daily recent lipids are well controlled continue the same medications refilled repeat fasting lipids in 1 year at his primary MD    4.  Right-sided inguinal hernia asymptomatic,    Planning for surgery in the near future at [facility]  Suggested patient avoiding laparoscopic or robotic assisted repair given previous history of extensive spontaneous SMA dissection.  Written information given        Written instructions with AVS given return to clinic in 12 months    40 minutes spent on the date of the encounter doing chart review, history and exam, documentation and further activities as noted above      This note was dictated by utilizing dragon software  Cc: Primary care physician , MD Nita Honeycutt MD, JOVAN, [facility]  Vascular Medicine  Clinical lipidologist  Clinical hypertension specialist

## 2024-10-08 ENCOUNTER — OFFICE VISIT (OUTPATIENT)
Dept: OTHER | Facility: CLINIC | Age: 64
End: 2024-10-08
Attending: INTERNAL MEDICINE
Payer: COMMERCIAL

## 2024-10-08 VITALS
WEIGHT: 171.6 LBS | HEART RATE: 69 BPM | BODY MASS INDEX: 26.09 KG/M2 | SYSTOLIC BLOOD PRESSURE: 119 MMHG | OXYGEN SATURATION: 98 % | DIASTOLIC BLOOD PRESSURE: 79 MMHG

## 2024-10-08 DIAGNOSIS — E78.5 HYPERLIPIDEMIA LDL GOAL <70: ICD-10-CM

## 2024-10-08 DIAGNOSIS — I10 BENIGN ESSENTIAL HYPERTENSION: ICD-10-CM

## 2024-10-08 DIAGNOSIS — I77.70 ARTERIAL DISSECTION (H): Primary | ICD-10-CM

## 2024-10-08 DIAGNOSIS — G47.00 INSOMNIA, UNSPECIFIED TYPE: ICD-10-CM

## 2024-10-08 DIAGNOSIS — R06.83 SNORING: ICD-10-CM

## 2024-10-08 PROCEDURE — 99213 OFFICE O/P EST LOW 20 MIN: CPT | Performed by: INTERNAL MEDICINE

## 2024-10-08 PROCEDURE — G2211 COMPLEX E/M VISIT ADD ON: HCPCS | Performed by: INTERNAL MEDICINE

## 2024-10-08 PROCEDURE — 99215 OFFICE O/P EST HI 40 MIN: CPT | Performed by: INTERNAL MEDICINE

## 2024-10-08 RX ORDER — HYDROCODONE BITARTRATE AND ACETAMINOPHEN 5; 325 MG/1; MG/1
1-2 TABLET ORAL EVERY 6 HOURS PRN
Qty: 30 TABLET | Refills: 0 | Status: SHIPPED | OUTPATIENT
Start: 2024-10-08

## 2024-10-08 RX ORDER — ROSUVASTATIN CALCIUM 20 MG/1
20 TABLET, COATED ORAL DAILY
Qty: 90 TABLET | Refills: 3 | Status: SHIPPED | OUTPATIENT
Start: 2024-10-08

## 2024-10-08 RX ORDER — LABETALOL 200 MG/1
200 TABLET, FILM COATED ORAL 2 TIMES DAILY
Qty: 180 TABLET | Refills: 3 | Status: SHIPPED | OUTPATIENT
Start: 2024-10-08

## 2024-10-08 RX ORDER — AMLODIPINE BESYLATE 5 MG/1
5 TABLET ORAL DAILY
Qty: 90 TABLET | Refills: 3 | Status: SHIPPED | OUTPATIENT
Start: 2024-10-08

## 2024-10-08 RX ORDER — RAMIPRIL 5 MG/1
5 CAPSULE ORAL DAILY
Qty: 90 CAPSULE | Refills: 3 | Status: SHIPPED | OUTPATIENT
Start: 2024-10-08

## 2024-10-08 NOTE — PROGRESS NOTES
CC:     Follow up visit,   Review of recent labs  Dealing with sleep issues and also snoring with some daytime somnolence and fatigue  Medication refills  Blood pressure readings are excellent range  Reviewed of outside labs   doing well. rarely using narco for pain. CTA in June 2017 healed dissection.  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  It was thought spontaneous and his workup was unremarkable and negative.    Later he  gave a history of injury  one week before dissection( blunt injury to the abdomen) and he ran into edge of  heavy duty board during the Labor Day weekend ( one week before admission)      he is doing well, blood pressure is well controlled with multiple medications.  He is tolerating statin.  He stopped warfarin after one  year  and currently on aspirin 81 mg daily  He denies any chest pain, shortness of breath or palpitations.  No abdominal pain, no altered bowel movements.  No hematemesis or melena.    Previous  CTA 6/2017 completely healed dissection of SMA.  Reviewed CTA and recent outside labs in the epic.    He developed right-sided inguinal hernia underwent open repair last year successfully  Requesting medication refill    PAST MEDICAL HISTORY  Past Medical History:   Diagnosis Date    Anxiety     HTN (hypertension)     Superior mesenteric artery thrombosis (H)     acute extensive isolated SMA disection without malperfusion 9/2015 admitted to [facility]       CURRENT MEDICATIONS  Current Outpatient Medications   Medication Sig Dispense Refill    amLODIPine (NORVASC) 5 MG tablet Take 1 tablet (5  mg) by mouth daily. Refill when due 90 tablet 3    aspirin EC 81 MG tablet Take 162 mg by mouth      HYDROcodone-acetaminophen (NORCO) 5-325 MG tablet Take 1-2 tablets by mouth every 6 hours as needed for moderate to severe pain. 30 tablet 0    labetalol (NORMODYNE) 200 MG tablet Take 1 tablet (200 mg) by mouth 2 times daily. Refill when due 180 tablet 3    multivitamin, therapeutic (THERA-VIT) TABS Take 1 tablet by mouth daily      Omega-3 Fatty Acids (FISH OIL PO)       order for DME Equipment being ordered: Blood pressure cuff 1 Device 0    ramipril (ALTACE) 5 MG capsule Take 1 capsule (5 mg) by mouth daily. 90 capsule 3    rosuvastatin (CRESTOR) 20 MG tablet Take 1 tablet (20 mg) by mouth daily. 90 tablet 3       PAST SURGICAL HISTORY:  Past Surgical History:   Procedure Laterality Date    ORTHOPEDIC SURGERY      left ankle       ALLERGIES   No Known Allergies    FAMILY HISTORY  Family History   Problem Relation Age of Onset    Cerebrovascular Disease Mother     Other Cancer Father         esophageal       VASCULAR FAMILY HISTORY  1st order relative with atherosclerotic PAD: No  1st order relative with AAA: No    SOCIAL HISTORY  Social History     Socioeconomic History    Marital status:      Spouse name: Kerry    Number of children: 3    Years of education: Not on file    Highest education level: Not on file   Occupational History    Occupation: fork lift      Comment: city \[employer]\""   Tobacco Use    Smoking status: Never    Smokeless tobacco: Former    Tobacco comments:     quit at age 29 ( chewed tobacco for 10 years??)   Substance and Sexual Activity    Alcohol use: Not Currently     Comment: occ beer     Drug use: No    Sexual activity: Yes     Partners: Female   Other Topics Concern    Not on file   Social History Narrative    Not on file     Social Determinants of Health     Financial Resource Strain: Low Risk  (10/1/2024)    Received from [facility]    Financial  Resource Strain     Difficulty of Paying Living Expenses: 3     Difficulty of Paying Living Expenses: Not on file   Food Insecurity: No Food Insecurity (10/1/2024)    [facility]    Food Insecurity     Worried About Running Out of Food in the Last Year: 1   Transportation Needs: No Transportation Needs (10/1/2024)    Received from [facility]    Transportation Needs     Lack of Transportation (Medical): 1   Physical Activity: Not on file   Stress: Not on file   Social Connections: Socially Integrated (10/1/2024)    [facility]    Social Connections     Frequency of Communication with Friends and Family: 0   Interpersonal Safety: Not on file   Housing Stability: Low Risk  (10/1/2024)    [facility]    Housing Stability     Unable to Pay for Housing in the Last Year: 1       ROS:   Constitutional: No fever, chills, or sweats. No weight gain/loss   ENT: No visual disturbance, ear ache, epistaxis, sore throat  Allergies/Immunologic: Negative  Respiratory: No cough, hemoptysia  Cardiovascular: As per HPI  GI: No nausea, vomiting, hematemesis, melena, or hematochezia  : No urinary frequency, dysuria, or hematuria  Integument: Negative  Psychiatric: Negative  Neuro: Negative  Endocrinology: Negative   Musculoskeletal: Negative  Vascular: No walking impairment, claudication, ischemic rest pain or nonhealing wounds    EXAM:  /79 (BP Location: Right arm, Patient Position: Sitting, Cuff Size: Adult Regular)   Pulse 69   Wt 171 lb 9.6 oz (77.8 kg)   SpO2 98%   BMI 26.09 kg/m    In general, the patient is a pleasant male in no apparent distress.    HEENT: NC/AT.  PERRLA.  EOMI.  Sclerae white, not injected.  Nares clear.  Pharynx without erythema or exudate.  Dentition intact.    Neck: No adenopathy.  No thyromegaly. Carotids +2/2 bilaterally  without bruits.  No jugular venous distension.   Heart: RRR. Normal S1, S2 splits physiologically. No murmur, rub, click, or gallop. The PMI is in the 5th ICS in the midclavicular line. There is no heave.    Lungs: CTA.  No ronchi, wheezes, rales.  No dullness to percussion.   Abdomen: Soft, nontender, nondistended. No organomegaly. No AAA.  No bruits.   He has a right-sided inguinal hernia which was repaired last year successfully  Extremities: No clubbing, cyanosis, or edema.  No wounds. No varicose veins signs of chronic venous insufficiency.   Vascular: No bruits are noted.   Brachial Radial Ulnar Femoral Popliteal DP PT   Left 2/2 2/2 2/2 2/2 2/2 2/2 2/2   Right 2/2 2/2 2/2 2/2 2/2 2/2 2/2     Labs:  LIPID RESULTS:  Lab Results   Component Value Date    CHOL 134 08/10/2021    CHOL 145 10/21/2016    HDL 42 08/10/2021    HDL 39 (L) 10/21/2016    LDL 76 08/10/2021    LDL 83 10/21/2016    TRIG 80 08/10/2021    TRIG 115 10/21/2016    CHOLHDLRATIO 3.6 10/20/2015       LIVER ENZYME RESULTS:  Lab Results   Component Value Date    AST 15 08/10/2021    AST 20 06/14/2017    ALT 35 08/10/2021    ALT 46 06/14/2017       CBC RESULTS:  Lab Results   Component Value Date    WBC 8.8 01/08/2016    RBC 4.99 01/08/2016    HGB 15.9 01/08/2016    HCT 43.7 01/08/2016    MCV 88 01/08/2016    MCH 31.9 01/08/2016    MCHC 36.4 01/08/2016    RDW 12.5 01/08/2016     01/08/2016       BMP RESULTS:  Lab Results   Component Value Date     08/10/2021     06/14/2017    POTASSIUM 4.3 08/10/2021    POTASSIUM 4.0 06/14/2017    CHLORIDE 106 08/10/2021    CHLORIDE 106 06/14/2017    CO2 32 08/10/2021    CO2 30 06/14/2017    ANIONGAP 1 (L) 08/10/2021    ANIONGAP 4 06/14/2017     (H) 08/10/2021     (H) 06/14/2017    BUN 15 08/10/2021    BUN 17 06/14/2017    CR 0.90 08/10/2021    CR 0.84 06/14/2017    GFRESTIMATED >90 08/10/2021    GFRESTIMATED >90  Non  GFR Calc   06/14/2017    GFRESTBLACK >90    American GFR Calc   06/14/2017    ALONDRA 9.3 08/10/2021    ALONDRA 8.8 06/14/2017        A1C RESULTS:  Lab Results   Component Value Date    A1C 5.3 09/11/2015     Assessment and Plan:     1.  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    Doing well with current meds    Currently on multiple BP meds with excellent control, taking ramipril 5 mg only   Recent lipids are excellent range   He denies any CP, Abdominal pain, no weight loss or postprandial issues.    He has no family history of connective tissue disorders and his clinical exam did not fit with Marfan's, EDS or LDS. In addition, his rheumatoid factor, TRINA, and vasculitis panel were all negative.  He is currently  on multiple antihypertensives, including amlodipine, labetalol, and ramipril. He is taking crestor 20  mg daily for pleiotropic benefit.No abdominal pain, hematemesis or melena.  His blood pressure is well controlled.     He underwent repeat CTA in 6/2017 As delineated above, completely healed dissection.    Reviewed recent lab results in care everywhere , all of them are good.     Plan:  Continue Crestor 20 mg daily   Continue  aspirin 81 mg daily with food.  Maintain good BP  Will plan for CTA only if clinical status changes.  Repeat fasting lipids and other labs in 12months at primary then follow-up with me    2. Hypertension    His blood pressure is well controlled     Tolerating medications without any problem.  Stopped hydrochlorothiazide due to elevated triglycerides  Continue rest of the blood pressure medications same  Monitor blood pressure and goal is less than 130/80  Refilled all meds   Follow-up in 1 year    3. Hyperlipidemia:  Doing well with Crestor 20 mg daily recent lipids are well  controlled continue the same medications refilled repeat fasting lipids in 1 year at his primary MD    4.  Insomnia and snoring history:  Arrange referral to see the sleep clinic for further evaluation and management    40 minutes spent on the date of the encounter doing chart review, review of previous imaging studies, recent laboratory test, history, exam, documentation and addressed above-mentioned issues.  Refilled multiple medications and arranged referral to see sleep clinic    AVS with written instructions given and he had a lot of questions all of them were answered    The longitudinal care of plan for the above diagnoses was addressed during this visit. Due to added complexity of care, we will continue to supprt Stanley Garcia and the subsequent management of this/these conditions and with ongoing continuity of care for this/these conditions.             Nita Romero MD, JOVAN, [facility]  Vascular Medicine  Clinical lipidologist  Clinical hypertension specialist

## 2024-10-08 NOTE — PATIENT INSTRUCTIONS
You may cut amlodipine  pill into half ( 2.5 mg ) and take daily     Refilled all meds     Recent labs looks good     Continue same medications     Join health club, pool exercises     Continue PT     Follow up in one year

## 2024-10-08 NOTE — PROGRESS NOTES
[facility]        Patient is here for a follow up.    Pt is currently taking Aspirin and Statin.    /79 (BP Location: Right arm, Patient Position: Sitting, Cuff Size: Adult Regular)   Pulse 69   Wt 171 lb 9.6 oz (77.8 kg)   SpO2 98%   BMI 26.09 kg/m      The provider has been notified that the patient has concerns of amlodipine (gums concern).     Questions patient would like addressed today are: N/A.    Refills are needed: Yes:  pend    Has homecare services and agency name:  Jenn Diehl MA

## 2025-01-09 ENCOUNTER — TELEPHONE (OUTPATIENT)
Dept: OTHER | Facility: CLINIC | Age: 65
End: 2025-01-09
Payer: COMMERCIAL

## 2025-01-09 NOTE — TELEPHONE ENCOUNTER
[facility]    Who is the name of the provider?:  FRANCES RAMOS   What is the location you see this provider at/preferred location?: [place]  Person calling / Facility: Stanley Garcia  Phone number:  [phone] (home)   Nurse call back needed:  Yes     Reason for call:  Patient called with a question of possibility of getting a Cialis prescription? Pharmacy confirmed below.     Pharmacy location:  [id number] - [place], [place] - [address]  Outside Imaging: n/a   Can we leave a detailed message on this number?  YES     1/9/2025, 3:48 PM

## 2025-01-10 NOTE — TELEPHONE ENCOUNTER
Returned call to Cheryl to call back and discuss further.    Lor ALMANZA, RN    [facility]  Vascular [facility]  Office: [phone]  Fax: [phone]

## 2025-01-10 NOTE — TELEPHONE ENCOUNTER
Patient called back stating he would like to know if it would be safe for him to take Cialis with his blood pressure medications. Routing to Dr Romero to advise.    Lor ALMANZA, RN    [facility]  Office: [phone]  Fax: [phone]

## 2025-01-14 NOTE — TELEPHONE ENCOUNTER
"  \"Sure no issue taking Cialis\"    LG     Returned call to patient and discussed per Dr. Romero it is ok to take Cialis and his PCP would need to prescribe. He verbalized understanding.    Lor ALMANZA, RN    [facility]  Vascular [facility]  Office: [phone]  Fax: [phone]      "